# Patient Record
Sex: FEMALE | Race: WHITE | Employment: FULL TIME | URBAN - NONMETROPOLITAN AREA
[De-identification: names, ages, dates, MRNs, and addresses within clinical notes are randomized per-mention and may not be internally consistent; named-entity substitution may affect disease eponyms.]

---

## 2017-11-01 ENCOUNTER — OFFICE VISIT (OUTPATIENT)
Dept: FAMILY MEDICINE CLINIC | Age: 22
End: 2017-11-01

## 2017-11-01 VITALS
DIASTOLIC BLOOD PRESSURE: 80 MMHG | OXYGEN SATURATION: 98 % | HEART RATE: 127 BPM | RESPIRATION RATE: 17 BRPM | BODY MASS INDEX: 36.82 KG/M2 | HEIGHT: 65 IN | WEIGHT: 221 LBS | SYSTOLIC BLOOD PRESSURE: 134 MMHG

## 2017-11-01 DIAGNOSIS — J06.9 VIRAL URI: Primary | ICD-10-CM

## 2017-11-01 PROCEDURE — 99203 OFFICE O/P NEW LOW 30 MIN: CPT | Performed by: NURSE PRACTITIONER

## 2017-11-01 PROCEDURE — G8484 FLU IMMUNIZE NO ADMIN: HCPCS | Performed by: NURSE PRACTITIONER

## 2017-11-01 PROCEDURE — 4004F PT TOBACCO SCREEN RCVD TLK: CPT | Performed by: NURSE PRACTITIONER

## 2017-11-01 PROCEDURE — G8417 CALC BMI ABV UP PARAM F/U: HCPCS | Performed by: NURSE PRACTITIONER

## 2017-11-01 PROCEDURE — G8427 DOCREV CUR MEDS BY ELIG CLIN: HCPCS | Performed by: NURSE PRACTITIONER

## 2017-11-01 RX ORDER — FLUTICASONE PROPIONATE 50 MCG
2 SPRAY, SUSPENSION (ML) NASAL DAILY
Qty: 1 BOTTLE | Refills: 0 | Status: SHIPPED | OUTPATIENT
Start: 2017-11-01 | End: 2019-08-06 | Stop reason: SDUPTHER

## 2017-11-01 RX ORDER — BENZONATATE 100 MG/1
100 CAPSULE ORAL 3 TIMES DAILY PRN
Qty: 60 CAPSULE | Refills: 0 | Status: SHIPPED | OUTPATIENT
Start: 2017-11-01 | End: 2017-11-08

## 2017-11-01 ASSESSMENT — ENCOUNTER SYMPTOMS
COUGH: 1
SINUS PAIN: 1
SORE THROAT: 1
SWOLLEN GLANDS: 0
NAUSEA: 1
DIARRHEA: 0
RHINORRHEA: 1
VOMITING: 0

## 2017-11-01 ASSESSMENT — PATIENT HEALTH QUESTIONNAIRE - PHQ9
1. LITTLE INTEREST OR PLEASURE IN DOING THINGS: 0
SUM OF ALL RESPONSES TO PHQ QUESTIONS 1-9: 0
2. FEELING DOWN, DEPRESSED OR HOPELESS: 0
SUM OF ALL RESPONSES TO PHQ9 QUESTIONS 1 & 2: 0

## 2017-11-01 NOTE — LETTER
[facility]. [place] [address]  Phone: [phone]  Fax: [phone]    Natacha Nunez CNP        November 1, 2017     Patient: Andria Llamas   YOB: 1995   Date of Visit: 11/1/2017       To Whom It May Concern: It is my medical opinion that Demetrio Sep may return to full duty immediately with no restrictions. If you have any questions or concerns, please don't hesitate to call.     Sincerely,        Natacha Nunez CNP

## 2018-06-07 ENCOUNTER — TELEPHONE (OUTPATIENT)
Dept: FAMILY MEDICINE CLINIC | Age: 23
End: 2018-06-07

## 2019-04-20 ENCOUNTER — HOSPITAL ENCOUNTER (EMERGENCY)
Age: 24
Discharge: HOME | End: 2019-04-20
Attending: EMERGENCY MEDICINE

## 2019-04-20 VITALS
SYSTOLIC BLOOD PRESSURE: 181 MMHG | OXYGEN SATURATION: 98 % | TEMPERATURE: 101.8 F | DIASTOLIC BLOOD PRESSURE: 91 MMHG | HEART RATE: 112 BPM | RESPIRATION RATE: 16 BRPM | WEIGHT: 220 LBS | HEIGHT: 65 IN | BODY MASS INDEX: 36.65 KG/M2

## 2019-04-20 DIAGNOSIS — R50.9 FEBRILE ILLNESS, ACUTE: ICD-10-CM

## 2019-04-20 DIAGNOSIS — N61.0 MASTITIS: Primary | ICD-10-CM

## 2019-04-20 PROCEDURE — 6370000000 HC RX 637 (ALT 250 FOR IP): Performed by: EMERGENCY MEDICINE

## 2019-04-20 PROCEDURE — 99283 EMERGENCY DEPT VISIT LOW MDM: CPT

## 2019-04-20 RX ORDER — CEPHALEXIN 500 MG/1
500 CAPSULE ORAL 4 TIMES DAILY
Qty: 40 CAPSULE | Refills: 0 | Status: SHIPPED | OUTPATIENT
Start: 2019-04-20 | End: 2019-08-20 | Stop reason: ALTCHOICE

## 2019-04-20 RX ORDER — ACETAMINOPHEN 500 MG
1000 TABLET ORAL ONCE
Status: COMPLETED | OUTPATIENT
Start: 2019-04-20 | End: 2019-04-20

## 2019-04-20 RX ORDER — CEPHALEXIN 500 MG/1
500 CAPSULE ORAL ONCE
Status: COMPLETED | OUTPATIENT
Start: 2019-04-20 | End: 2019-04-20

## 2019-04-20 RX ADMIN — CEPHALEXIN 500 MG: 500 CAPSULE ORAL at 20:30

## 2019-04-20 RX ADMIN — ACETAMINOPHEN 1000 MG: 500 TABLET ORAL at 20:30

## 2019-04-20 ASSESSMENT — PAIN DESCRIPTION - LOCATION: LOCATION: BREAST;GENERALIZED

## 2019-04-20 ASSESSMENT — PAIN DESCRIPTION - ORIENTATION: ORIENTATION: LEFT;RIGHT

## 2019-04-20 ASSESSMENT — PAIN SCALES - GENERAL: PAINLEVEL_OUTOF10: 6

## 2019-04-20 ASSESSMENT — PAIN DESCRIPTION - DESCRIPTORS: DESCRIPTORS: ACHING

## 2019-04-21 NOTE — ED PROVIDER NOTES
Emergency Department Encounter  Location: [address]    Patient: Michael Wilkins  MRN: [id number]  : 1995  Date of evaluation: 2019  ED Provider: Marquita Lundborg, DO, FACEP    Chief Complaint:    Fever (since yesterday) and Nausea    [place]:  Michael Wilkins is a 21 y.o. female that presents to the emergency department with complaints of fever and nausea. The patient states that she is currently breast-feeding and she thinks she may have mastitis. She states her illness started a couple days ago with tenderness to her breasts and her breast feeling hot. Upon arrival to the ER the patient is a temp to 101.8. She states she's slightly nauseated but has not vomited. She denies any diarrhea. She's had no cough. She denies sore throat or upper respiratory congestion or upper respiratory symptoms. The patient has a 3month-old and she states she has been breast-feeding and even though her breasts feel engorged she does not feel like she is able to produce milk. She states her left breast is worse than her right. She denies any redness or streaking around the nipple. She states she's had no previous history of mastitis. She states her ObGyn doctor is Dr. John Hopkins from [place]. ROS:  At least 10 systems reviewed and otherwise acutely negative except as in the [address] History reviewed. No pertinent past medical history. Past Surgical History:   Procedure Laterality Date    CYST REMOVAL Left      History reviewed. No pertinent family history.   Social History     Socioeconomic History    Marital status:      Spouse name: Not on file    Number of children: Not on file    Years of education: Not on file    Highest education level: Not on file   Occupational History    Not on file   Social Needs    Financial resource strain: Not on file    Food insecurity:     Worry: Not on file     Inability: Not on file    Transportation needs:     Medical: Not on file Non-medical: Not on file   Tobacco Use    Smoking status: Current Some Day Smoker    Smokeless tobacco: Never Used   Substance and Sexual Activity    Alcohol use: Yes    Drug use: No    Sexual activity: Yes     Partners: Female   Lifestyle    Physical activity:     Days per week: Not on file     Minutes per session: Not on file    Stress: Not on file   Relationships    Social connections:     Talks on phone: Not on file     Gets together: Not on file     Attends Oriental orthodox service: Not on file     Active member of club or organization: Not on file     Attends meetings of clubs or organizations: Not on file     Relationship status: Not on file    Intimate partner violence:     Fear of current or ex partner: Not on file     Emotionally abused: Not on file     Physically abused: Not on file     Forced sexual activity: Not on file   Other Topics Concern    Not on file   Social History Narrative    Not on file     No current facility-administered medications for this encounter. Current Outpatient Medications   Medication Sig Dispense Refill    cephALEXin (KEFLEX) 500 MG capsule Take 1 capsule by mouth 4 times daily 40 capsule 0    fluticasone (FLONASE) 50 MCG/ACT nasal spray 2 sprays by Nasal route daily 1 Bottle 0     No Known Allergies    Nursing Notes Reviewed    Physical Exam:  ED Triage Vitals [04/20/19 1959]   Enc Vitals Group      BP (!) 181/91      Pulse 112      Resp 16      Temp 101.8 °F (38.8 °C)      Temp Source Oral      SpO2 98 %      Weight 220 lb (99.8 kg)      Height 5' 5\" (1.651 m)      Head Circumference       Peak Flow       Pain Score       Pain Loc       Pain Edu? Excl. in [address]? GENERAL APPEARANCE: Awake and alert. Cooperative. No acute distress. Nontoxic in appearance. She feels very hot  HEAD: Normocephalic. Atraumatic. EYES: EOM's grossly intact. Sclera anicteric. ENT: Tolerates saliva. No trismus. NECK: Supple. Trachea midline. CARDIO: RRR. Radial pulse 2+. Examination of her breasts reveals tenderness to palpation along the border of the left nipple. There is fullness to the breasts bilaterally. There is no redness or streaking that be seen but there is tenderness to palpation along the edge of the nipple especially in the left breast from the 12:00 to 3:00 position. LUNGS: Respirations unlabored. CTAB. ABDOMEN: Soft. Non-distended. Non-tender. EXTREMITIES: No acute deformities. SKIN: Warm and dry. NEUROLOGICAL: No gross facial drooping. Moves all 4 extremities spontaneously. PSYCHIATRIC: Normal mood. Labs:  No results found for this visit on 04/20/19. Radiographs (if obtained):  [] The following radiograph was interpreted by myself in the absence of a radiologist:  [x] Radiologist's Report reviewed at time of ED visit:  No orders to display       ED Course and MDM:  At this time the patient will be started on Keflex and is encouraged to use Tylenol and ibuprofen for fever. She is encouraged to follow-up with her ObGyn doctor for recheck early next week. She is encouraged to pump and dump for the next few days until her symptoms receded. She is instructed to return if her condition worsens. She is discharged in stable condition at this time with a presumptive diagnosis of mastitis. Final Impression:  1. Mastitis    2.  Febrile illness, acute      DISPOSITION Decision To Discharge    Patient referred to:  Gregory Lilly MD    In 2 days  For follow up    Discharge medications:  Discharge Medication List as of 4/20/2019  8:28 PM      START taking these medications    Details   cephALEXin (KEFLEX) 500 MG capsule Take 1 capsule by mouth 4 times daily, Disp-40 capsule, R-0Print           (Please note that portions of this note may have been completed with a voice recognition program. Efforts were made to edit the dictations but occasionally words are mis-transcribed.)    Citlaly Tirado DO, [facility]  Board certified in [address] [facility] [address],   04/20/19 2110

## 2019-08-06 DIAGNOSIS — J06.9 VIRAL URI: ICD-10-CM

## 2019-08-06 RX ORDER — FLUTICASONE PROPIONATE 50 MCG
2 SPRAY, SUSPENSION (ML) NASAL DAILY
Qty: 1 BOTTLE | Refills: 5 | Status: SHIPPED | OUTPATIENT
Start: 2019-08-06 | End: 2019-08-20 | Stop reason: ALTCHOICE

## 2019-08-20 ENCOUNTER — OFFICE VISIT (OUTPATIENT)
Dept: FAMILY MEDICINE CLINIC | Age: 24
End: 2019-08-20
Payer: COMMERCIAL

## 2019-08-20 VITALS
DIASTOLIC BLOOD PRESSURE: 80 MMHG | WEIGHT: 236.6 LBS | HEART RATE: 88 BPM | RESPIRATION RATE: 16 BRPM | BODY MASS INDEX: 39.37 KG/M2 | SYSTOLIC BLOOD PRESSURE: 124 MMHG

## 2019-08-20 DIAGNOSIS — R00.2 HEART PALPITATIONS: Primary | ICD-10-CM

## 2019-08-20 PROCEDURE — 99213 OFFICE O/P EST LOW 20 MIN: CPT | Performed by: NURSE PRACTITIONER

## 2019-08-20 ASSESSMENT — ENCOUNTER SYMPTOMS
CHEST TIGHTNESS: 0
ABDOMINAL PAIN: 0
SHORTNESS OF BREATH: 0
EYES NEGATIVE: 1
EYE PAIN: 0
NAUSEA: 0
WHEEZING: 0
COUGH: 0
RESPIRATORY NEGATIVE: 1
GASTROINTESTINAL NEGATIVE: 1

## 2019-08-20 ASSESSMENT — PATIENT HEALTH QUESTIONNAIRE - PHQ9
1. LITTLE INTEREST OR PLEASURE IN DOING THINGS: 0
2. FEELING DOWN, DEPRESSED OR HOPELESS: 0
SUM OF ALL RESPONSES TO PHQ9 QUESTIONS 1 & 2: 0
SUM OF ALL RESPONSES TO PHQ QUESTIONS 1-9: 0
SUM OF ALL RESPONSES TO PHQ QUESTIONS 1-9: 0

## 2021-09-02 ENCOUNTER — E-VISIT (OUTPATIENT)
Dept: OTHER | Facility: CLINIC | Age: 26
End: 2021-09-02

## 2021-09-02 DIAGNOSIS — M54.50 ACUTE MIDLINE LOW BACK PAIN, UNSPECIFIED WHETHER SCIATICA PRESENT: Primary | ICD-10-CM

## 2021-09-02 PROCEDURE — 99421 OL DIG E/M SVC 5-10 MIN: CPT | Performed by: NURSE PRACTITIONER

## 2021-09-06 RX ORDER — CYCLOBENZAPRINE HCL 5 MG
5 TABLET ORAL 3 TIMES DAILY PRN
Qty: 30 TABLET | Refills: 0 | Status: SHIPPED | OUTPATIENT
Start: 2021-09-06 | End: 2021-09-16

## 2021-09-06 RX ORDER — METHYLPREDNISOLONE 4 MG/1
TABLET ORAL
Qty: 1 KIT | Refills: 0 | Status: SHIPPED | OUTPATIENT
Start: 2021-09-06 | End: 2021-09-12

## 2021-09-06 NOTE — PROGRESS NOTES
Reviewed questionnaire    Reviewed meds/allergies    Dx Lower back pain    Plan Rx given for flexeril and medrol dose pack, follow up with PCP if no improvement    Time spent on visit 5 min

## 2021-09-14 ENCOUNTER — OFFICE VISIT (OUTPATIENT)
Dept: FAMILY MEDICINE CLINIC | Age: 26
End: 2021-09-14
Payer: COMMERCIAL

## 2021-09-14 VITALS
RESPIRATION RATE: 16 BRPM | WEIGHT: 238.2 LBS | DIASTOLIC BLOOD PRESSURE: 60 MMHG | SYSTOLIC BLOOD PRESSURE: 110 MMHG | TEMPERATURE: 98.1 F | BODY MASS INDEX: 39.64 KG/M2 | OXYGEN SATURATION: 99 % | HEART RATE: 97 BPM

## 2021-09-14 DIAGNOSIS — J30.2 SEASONAL ALLERGIES: ICD-10-CM

## 2021-09-14 DIAGNOSIS — E66.9 OBESITY, UNSPECIFIED CLASSIFICATION, UNSPECIFIED OBESITY TYPE, UNSPECIFIED WHETHER SERIOUS COMORBIDITY PRESENT: ICD-10-CM

## 2021-09-14 DIAGNOSIS — R42 DIZZY SPELLS: ICD-10-CM

## 2021-09-14 DIAGNOSIS — G47.9 SLEEP DISTURBANCE: Primary | ICD-10-CM

## 2021-09-14 DIAGNOSIS — Z13.1 DIABETES MELLITUS SCREENING: ICD-10-CM

## 2021-09-14 DIAGNOSIS — Z82.0 FAMILY HISTORY OF SLEEP APNEA: ICD-10-CM

## 2021-09-14 DIAGNOSIS — R06.02 SHORTNESS OF BREATH: ICD-10-CM

## 2021-09-14 DIAGNOSIS — F32.A DEPRESSION, UNSPECIFIED DEPRESSION TYPE: ICD-10-CM

## 2021-09-14 DIAGNOSIS — G44.209 TENSION HEADACHE: ICD-10-CM

## 2021-09-14 DIAGNOSIS — Z01.30 BLOOD PRESSURE CHECK: ICD-10-CM

## 2021-09-14 PROCEDURE — 99214 OFFICE O/P EST MOD 30 MIN: CPT | Performed by: PHYSICIAN ASSISTANT

## 2021-09-14 RX ORDER — FLUTICASONE PROPIONATE 50 MCG
1 SPRAY, SUSPENSION (ML) NASAL DAILY
Qty: 16 G | Refills: 0 | Status: SHIPPED | OUTPATIENT
Start: 2021-09-14

## 2021-09-14 RX ORDER — CETIRIZINE HYDROCHLORIDE 10 MG/1
10 TABLET ORAL DAILY
Qty: 90 TABLET | Refills: 1 | Status: SHIPPED | OUTPATIENT
Start: 2021-09-14

## 2021-09-14 ASSESSMENT — PATIENT HEALTH QUESTIONNAIRE - PHQ9
4. FEELING TIRED OR HAVING LITTLE ENERGY: 2
SUM OF ALL RESPONSES TO PHQ QUESTIONS 1-9: 6
8. MOVING OR SPEAKING SO SLOWLY THAT OTHER PEOPLE COULD HAVE NOTICED. OR THE OPPOSITE, BEING SO FIGETY OR RESTLESS THAT YOU HAVE BEEN MOVING AROUND A LOT MORE THAN USUAL: 0
9. THOUGHTS THAT YOU WOULD BE BETTER OFF DEAD, OR OF HURTING YOURSELF: 0
3. TROUBLE FALLING OR STAYING ASLEEP: 0
5. POOR APPETITE OR OVEREATING: 1
SUM OF ALL RESPONSES TO PHQ QUESTIONS 1-9: 6
SUM OF ALL RESPONSES TO PHQ QUESTIONS 1-9: 6
7. TROUBLE CONCENTRATING ON THINGS, SUCH AS READING THE NEWSPAPER OR WATCHING TELEVISION: 0
SUM OF ALL RESPONSES TO PHQ9 QUESTIONS 1 & 2: 3
2. FEELING DOWN, DEPRESSED OR HOPELESS: 1
6. FEELING BAD ABOUT YOURSELF - OR THAT YOU ARE A FAILURE OR HAVE LET YOURSELF OR YOUR FAMILY DOWN: 0
1. LITTLE INTEREST OR PLEASURE IN DOING THINGS: 2
10. IF YOU CHECKED OFF ANY PROBLEMS, HOW DIFFICULT HAVE THESE PROBLEMS MADE IT FOR YOU TO DO YOUR WORK, TAKE CARE OF THINGS AT HOME, OR GET ALONG WITH OTHER PEOPLE: 1

## 2021-09-14 NOTE — PROGRESS NOTES
Jovanni Daigle (:  1995) is a 32 y.o. female,Established patient, here for evaluation of the following chief complaint(s): Other (patient is having issues with low blood pressure ), Dizziness (has dizziness off and on last episode was last wednesday ), Headache (having headaches often ), and Diabetes (has concerns about diabetes has a family history )    This is my first patient encounter with Jovanni Daigle; chart review was completed. SUBJECTIVE/OBJECTIVE:  LILIANA Daigle is a pleasant 32 y.o. female presenting to clinic today to establish care with new provider/multiple medical complaints. Patient expresses concern for low blood pressure; patient reports she has checked her blood pressures occasionally over the last several weeks and reports readings of 109/74, 119/80; today in clinic blood pressure is 110/60. Patient denies excess fatigue or ongoing lightheadedness. Dizziness -patient reports a few episodes of episodic dizziness with sensation of room spinning which occurs with sudden movements of the head, bending over and standing up quickly; patient reports she has never experienced these sensations before; patient reports episodes to be last 15 to 20 seconds and resolve on their own. Patient denies other neurologic complaints, denies tinnitus or hearing changes. Headaches -patient describes an achy nondebilitating but noticeable headache in her forehead which she experiences approximately once every 2 days or so; patient denies known trigger however has had change in sleep schedule as she now is waking up very early for new job; patient reports that she takes Tylenol for his headaches which does provide benefit.      Sleep disturbance -patient reports that she typically wakes up feeling short of breath; patient states that she experiences labored breathing for a few minutes upon awakening; patient reports this has been ongoing now for the past couple months; patient does report mild tiredness throughout the day, did recently start a new job and is waking up at 3 AM daily. Patient does report that mother has obstructive sleep apnea. Patient reports that she does snore. Diabetes screening -patient would like to be screened for diabetes; reports significant family history of both type I and type 2 diabetes patient states that she checked her blood sugar recently and it was 132 while fasting. Patient reports mild polyuria and polydipsia however denies other symptoms. History of palpitations - patient does have history of sensation of palpitations, previous EKG has been normal, Holter monitor ordered by previous PCP came back normal.  Patient denies any recent issues or symptoms. Low back pain flareup-patient seen via E-Visit 9/6/2021; given prescription for Flexeril and Medrol Dosepak. Reports prior similar episodes in the past.      Current Outpatient Medications   Medication Sig Dispense Refill    sertraline (ZOLOFT) 50 MG tablet Take 1 tablet by mouth daily 90 tablet 1    cetirizine (ZYRTEC) 10 MG tablet Take 1 tablet by mouth daily 90 tablet 1    fluticasone (FLONASE) 50 MCG/ACT nasal spray 1 spray by Each Nostril route daily 16 g 0    cyclobenzaprine (FLEXERIL) 5 MG tablet Take 1 tablet by mouth 3 times daily as needed for Muscle spasms (Patient taking differently: Take 5 mg by mouth 3 times daily as needed for Muscle spasms prn) 30 tablet 0     No current facility-administered medications for this visit. Review of Systems   Constitutional: Positive for fatigue. Negative for appetite change, chills and fever. HENT: Negative for congestion, ear pain, hearing loss, rhinorrhea and sore throat. Eyes: Negative for photophobia, pain, discharge and redness. Respiratory: Negative for cough, chest tightness, shortness of breath and wheezing. Cardiovascular: Negative for chest pain, palpitations and leg swelling.    Gastrointestinal: Negative for abdominal pain, blood in stool, constipation, diarrhea, nausea and vomiting. Endocrine: Positive for polydipsia and polyuria. Genitourinary: Negative for difficulty urinating, dysuria, flank pain, frequency, hematuria and urgency. Musculoskeletal: Negative for arthralgias, back pain, gait problem and joint swelling. Skin: Negative for color change and rash. Neurological: Positive for dizziness ( spells) and headaches. Negative for syncope, weakness and light-headedness. Hematological: Negative for adenopathy. Psychiatric/Behavioral: Positive for sleep disturbance. Negative for agitation, behavioral problems and suicidal ideas. The patient is not nervous/anxious. Physical Exam  Vitals and nursing note reviewed. Constitutional:       General: She is not in acute distress. Appearance: She is obese. She is not ill-appearing. HENT:      Head: Normocephalic and atraumatic. Right Ear: Tympanic membrane and external ear normal.      Left Ear: Tympanic membrane and external ear normal.      Nose: No congestion or rhinorrhea. Mouth/Throat:      Mouth: Mucous membranes are moist.      Pharynx: No oropharyngeal exudate or posterior oropharyngeal erythema. Neck:      Vascular: No carotid bruit. Cardiovascular:      Rate and Rhythm: Normal rate. Pulses: Normal pulses. Pulmonary:      Effort: Pulmonary effort is normal.   Abdominal:      Palpations: Abdomen is soft. Musculoskeletal:         General: Normal range of motion. Cervical back: Normal range of motion. No rigidity. Skin:     General: Skin is warm and dry. Capillary Refill: Capillary refill takes less than 2 seconds. Neurological:      Mental Status: She is alert and oriented to person, place, and time. Mental status is at baseline. Psychiatric:         Mood and Affect: Mood normal.         ASSESSMENT/PLAN:  1.  Sleep disturbance   -Patient would like to have sleep study done, possible underlying obstructive sleep apnea. Recommend weight loss. -     Prabha Jackson CNP, Pulmonology, [place]  2. Family history of sleep apnea  -     Kristina Hollis, BETTY, Pulmonology, [place]  3. Diabetes mellitus screening   -Patient on patient's symptoms and family history and body habitus, will check A1c. Recommend low carbohydrate diet. Increase physical activity. -     Comprehensive Metabolic Panel; Future  -     HEMOGLOBIN A1C; Future  4. Obesity, unspecified classification, unspecified obesity type, unspecified whether serious comorbidity present   -The patient is asked to make an attempt to improve diet and exercise patterns to aid in medical management of this problem. -     CBC Auto Differential; Future  -     Comprehensive Metabolic Panel; Future  -     Lipid Panel; Future  -     HEMOGLOBIN A1C; Future  5. Depression, unspecified depression type   -Continue with Zoloft as previously prescribed; patient reports that she self discontinued this medication and noticed a difference in mood and depression; has recently restarted and is requesting refill. Patient denies side effects or issues; patient denies SI.  -     sertraline (ZOLOFT) 50 MG tablet; Take 1 tablet by mouth daily, Disp-90 tablet, R-1Normal  6. Seasonal allergies   -Patient reports that allergy symptoms are somewhat contributory to headaches, reports occasional sinus congestion; recommend initiation of antihistamine and Flonase daily during allergy season etc.  Discussed proper use, side effects etc.  -     cetirizine (ZYRTEC) 10 MG tablet; Take 1 tablet by mouth daily, Disp-90 tablet, R-1Normal  -     fluticasone (FLONASE) 50 MCG/ACT nasal spray; 1 spray by Each Nostril route daily, Disp-16 g, R-0Normal  7.  Dizzy spells   -Likely BPPV; stressed adequate fluid intake and control of allergy symptoms; patient reports that episodes only last for 15 to 20 seconds; recommend avoidance of maneuvers likely to trigger dizzy spells; can consider referral to ENT if persistent or worsening. 8. Tension headache   -Recommend lifestyle modifications, adequate hydration, proper nutrition/adequate protein intake, will obtain sleep study as sleep apnea may be contributory; can continue with Tylenol or ibuprofen as needed for headaches. Patient denies any other neurologic symptoms associated with headaches. 9. Shortness of breath   -Upon awakening; referral placed for pulmonology to obtain sleep study. -     Darlin Santos, CNP, Pulmonology, [place]  10. Blood pressure check   -Patient's reported blood pressures at home and blood pressure today in clinic is not overly concerning at this time; advised patient to intermittently check blood pressures at home and if numbers are consistently less than 100 or less than 60 and patient is symptomatic, can consider referral to cardiology. Recommend adequate hydration etc.    Return in about 6 months (around 3/14/2022), or if symptoms worsen or fail to improve, for Follow Up. An electronic signature was used to authenticate this note.     --MIKEY Herrera

## 2021-09-15 ASSESSMENT — ENCOUNTER SYMPTOMS
EYE PAIN: 0
CONSTIPATION: 0
COUGH: 0
VOMITING: 0
PHOTOPHOBIA: 0
RHINORRHEA: 0
BACK PAIN: 0
BLOOD IN STOOL: 0
CHEST TIGHTNESS: 0
COLOR CHANGE: 0
DIARRHEA: 0
NAUSEA: 0
SHORTNESS OF BREATH: 0
EYE REDNESS: 0
EYE DISCHARGE: 0
SORE THROAT: 0
ABDOMINAL PAIN: 0
WHEEZING: 0

## 2021-09-16 ENCOUNTER — E-VISIT (OUTPATIENT)
Dept: PRIMARY CARE CLINIC | Age: 26
End: 2021-09-16
Payer: COMMERCIAL

## 2021-09-16 DIAGNOSIS — J30.9 ALLERGIC RHINITIS, UNSPECIFIED SEASONALITY, UNSPECIFIED TRIGGER: Primary | ICD-10-CM

## 2021-09-16 DIAGNOSIS — R09.89 CHEST CONGESTION: ICD-10-CM

## 2021-09-16 PROCEDURE — 99422 OL DIG E/M SVC 11-20 MIN: CPT | Performed by: INTERNAL MEDICINE

## 2021-09-16 RX ORDER — GUAIFENESIN 600 MG/1
1200 TABLET, EXTENDED RELEASE ORAL 2 TIMES DAILY PRN
COMMUNITY
Start: 2021-09-16

## 2021-09-16 RX ORDER — FLUTICASONE PROPIONATE 50 MCG
1 SPRAY, SUSPENSION (ML) NASAL DAILY
Qty: 16 G | Refills: 10 | Status: SHIPPED | OUTPATIENT
Start: 2021-09-16 | End: 2022-09-11

## 2021-09-16 ASSESSMENT — LIFESTYLE VARIABLES
SMOKING_YEARS: 1
SMOKING_STATUS: YES

## 2021-09-16 NOTE — PROGRESS NOTES
Your symptoms are not consistent with a sinus infection. You probably have a viral infection with increased mucus production and your coughing to clear your secretions. Mucinex may help as well as Flonase to reduce the congestion in your sinuses. Both medications will be sent to the pharmacy. Continue to use Tylenol for muscle, joint aches and for fever reduction. 11 to 20 minutes were spent completing this ED visit.

## 2023-01-19 ENCOUNTER — APPOINTMENT (OUTPATIENT)
Dept: ULTRASOUND IMAGING | Age: 28
End: 2023-01-19
Payer: COMMERCIAL

## 2023-01-19 ENCOUNTER — HOSPITAL ENCOUNTER (EMERGENCY)
Age: 28
Discharge: HOME | End: 2023-01-19
Attending: EMERGENCY MEDICINE
Payer: COMMERCIAL

## 2023-01-19 VITALS
SYSTOLIC BLOOD PRESSURE: 120 MMHG | BODY MASS INDEX: 41.65 KG/M2 | WEIGHT: 250 LBS | HEIGHT: 65 IN | RESPIRATION RATE: 17 BRPM | HEART RATE: 88 BPM | TEMPERATURE: 98.6 F | OXYGEN SATURATION: 99 % | DIASTOLIC BLOOD PRESSURE: 53 MMHG

## 2023-01-19 DIAGNOSIS — O20.0 THREATENED MISCARRIAGE: ICD-10-CM

## 2023-01-19 DIAGNOSIS — O46.90 VAGINAL BLEEDING IN PREGNANCY: Primary | ICD-10-CM

## 2023-01-19 LAB
ALBUMIN SERPL-MCNC: 4.4 GM/DL (ref 3.4–5)
ALP BLD-CCNC: 71 IU/L (ref 40–129)
ALT SERPL-CCNC: 14 U/L (ref 10–40)
ANION GAP SERPL CALCULATED.3IONS-SCNC: 13 MMOL/L (ref 4–16)
AST SERPL-CCNC: 11 IU/L (ref 15–37)
BACTERIA: ABNORMAL /HPF
BASOPHILS ABSOLUTE: 0.1 K/CU MM
BASOPHILS RELATIVE PERCENT: 0.5 % (ref 0–1)
BILIRUB SERPL-MCNC: 0.2 MG/DL (ref 0–1)
BILIRUBIN URINE: NEGATIVE MG/DL
BLOOD, URINE: ABNORMAL
BUN BLDV-MCNC: 8 MG/DL (ref 6–23)
CALCIUM SERPL-MCNC: 9.2 MG/DL (ref 8.3–10.6)
CAST TYPE: ABNORMAL /HPF
CHLORIDE BLD-SCNC: 103 MMOL/L (ref 99–110)
CLARITY: CLEAR
CO2: 21 MMOL/L (ref 21–32)
COLOR: YELLOW
CREAT SERPL-MCNC: 0.4 MG/DL (ref 0.6–1.1)
CRYSTAL TYPE: NEGATIVE /HPF
DIFFERENTIAL TYPE: ABNORMAL
EOSINOPHILS ABSOLUTE: 0.1 K/CU MM
EOSINOPHILS RELATIVE PERCENT: 0.7 % (ref 0–3)
EPITHELIAL CELLS, UA: 1 /HPF
GFR SERPL CREATININE-BSD FRML MDRD: >60 ML/MIN/1.73M2
GLUCOSE BLD-MCNC: 106 MG/DL (ref 70–99)
GLUCOSE, URINE: NEGATIVE MG/DL
GONADOTROPIN, CHORIONIC (HCG) QUANT: 5762 UIU/ML
HCT VFR BLD CALC: 36.8 % (ref 37–47)
HEMOGLOBIN: 11.8 GM/DL (ref 12.5–16)
IMMATURE NEUTROPHIL %: 0.3 % (ref 0–0.43)
KETONES, URINE: NEGATIVE MG/DL
LEUKOCYTE ESTERASE, URINE: ABNORMAL
LYMPHOCYTES ABSOLUTE: 3.3 K/CU MM
LYMPHOCYTES RELATIVE PERCENT: 28.7 % (ref 24–44)
MCH RBC QN AUTO: 25.8 PG (ref 27–31)
MCHC RBC AUTO-ENTMCNC: 32.1 % (ref 32–36)
MCV RBC AUTO: 80.5 FL (ref 78–100)
MONOCYTES ABSOLUTE: 0.8 K/CU MM
MONOCYTES RELATIVE PERCENT: 7.3 % (ref 0–4)
NITRITE URINE, QUANTITATIVE: NEGATIVE
PDW BLD-RTO: 13.7 % (ref 11.7–14.9)
PH, URINE: 6.5 (ref 5–8)
PLATELET # BLD: 299 K/CU MM (ref 140–440)
PMV BLD AUTO: 9.5 FL (ref 7.5–11.1)
POTASSIUM SERPL-SCNC: 3.6 MMOL/L (ref 3.5–5.1)
PROTEIN UA: NEGATIVE MG/DL
RBC # BLD: 4.57 M/CU MM (ref 4.2–5.4)
RBC URINE: 3 /HPF (ref 0–6)
SEGMENTED NEUTROPHILS ABSOLUTE COUNT: 7.2 K/CU MM
SEGMENTED NEUTROPHILS RELATIVE PERCENT: 62.5 % (ref 36–66)
SODIUM BLD-SCNC: 137 MMOL/L (ref 135–145)
SPECIFIC GRAVITY UA: <1.005 (ref 1–1.03)
TOTAL IMMATURE NEUTOROPHIL: 0.04 K/CU MM
TOTAL PROTEIN: 7.3 GM/DL (ref 6.4–8.2)
UROBILINOGEN, URINE: 0.2 MG/DL (ref 0.2–1)
WBC # BLD: 11.5 K/CU MM (ref 4–10.5)
WBC UA: 1 /HPF (ref 0–5)

## 2023-01-19 PROCEDURE — 80053 COMPREHEN METABOLIC PANEL: CPT

## 2023-01-19 PROCEDURE — 93975 VASCULAR STUDY: CPT

## 2023-01-19 PROCEDURE — 84702 CHORIONIC GONADOTROPIN TEST: CPT

## 2023-01-19 PROCEDURE — 76817 TRANSVAGINAL US OBSTETRIC: CPT

## 2023-01-19 PROCEDURE — 85025 COMPLETE CBC W/AUTO DIFF WBC: CPT

## 2023-01-19 PROCEDURE — 81001 URINALYSIS AUTO W/SCOPE: CPT

## 2023-01-19 PROCEDURE — 99284 EMERGENCY DEPT VISIT MOD MDM: CPT

## 2023-01-19 ASSESSMENT — PAIN DESCRIPTION - DESCRIPTORS: DESCRIPTORS: ACHING;CRAMPING

## 2023-01-19 ASSESSMENT — PAIN DESCRIPTION - PAIN TYPE: TYPE: ACUTE PAIN

## 2023-01-19 ASSESSMENT — PAIN DESCRIPTION - LOCATION: LOCATION: ABDOMEN

## 2023-01-19 ASSESSMENT — PAIN SCALES - GENERAL: PAINLEVEL_OUTOF10: 1

## 2023-01-19 ASSESSMENT — PAIN - FUNCTIONAL ASSESSMENT: PAIN_FUNCTIONAL_ASSESSMENT: 0-10

## 2023-01-19 NOTE — ED TRIAGE NOTES
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

## 2023-01-20 NOTE — DISCHARGE INSTRUCTIONS
Your quantitative hCG which is your blood pregnancy test today came back at around 5700. Your ultrasound does show a gestational sac and a yolk sac but no fetal pole. Please follow-up in 2 days for repeat quantitative hCG testing as well as repeat imaging. He may go to your obstetrician or come back to the emergency department for further testing. If you develop any worsening concerning symptoms, please seek immediate medical evaluation.

## 2023-01-20 NOTE — ED PROVIDER NOTES
[facility] [id number]      Pt Name: Arturo Cowan  MRN: [id number]  [place] 1995  Date of evaluation: 1/19/2023  Provider: Brianna Payne MD    CHIEF COMPLAINT       Chief Complaint   Patient presents with    Vaginal Bleeding     While 8 weeks pregnant - hx of multiple miscarriages         HISTORY OF PRESENT ILLNESS      Arturo Cowan is a 32 y.o. female who presents to the emergency department  for   Chief Complaint   Patient presents with    Vaginal Bleeding     While 8 weeks pregnant - hx of multiple miscarriages       51-year-old female presents with vaginal bleeding. She states that the bleeding started postcoitally. She is a G6, P2 who reports that she is about 8 weeks pregnant based on dates. She does not yet have a confirmed intrauterine pregnancy. She has been seen by an obstetrician in [facility] and underwent a blood pregnancy test.  She denies any abdominal trauma or injury. She is having some mild lower abdominal pain. Denies any dysuria. No fever, chills or constitutional infectious symptoms. No history of bleeding difficulties. She is not anticoagulated. GCS of 15. She reports that her bleeding is about as heavy as a menstrual cycle. She reports that her blood type is A+. Nursing Notes, Triage Notes & Vital Signs were reviewed. REVIEW OF SYSTEMS    (2-9 systems for level 4, 10 or more for level 5)     Review of Systems   Genitourinary:  Positive for vaginal bleeding. Except as noted above the remainder of the review of systems was reviewed and negative. PAST MEDICAL HISTORY   History reviewed. No pertinent past medical history. Prior to Admission medications    Medication Sig Start Date End Date Taking?  Authorizing Provider   guaiFENesin (MUCINEX) 600 MG extended release tablet Take 2 tablets by mouth 2 times daily as needed for Congestion 9/16/21   Sonu Serra MD   fluticasone [facility]) 50 MCG/ACT nasal spray 1 spray by Nasal route daily 9/16/21 9/11/22  John Payne MD   sertraline (ZOLOFT) 50 MG tablet Take 1 tablet by mouth daily 9/14/21   MIKEY Kim   cetirizine (ZYRTEC) 10 MG tablet Take 1 tablet by mouth daily 9/14/21   MIKEY Kim   fluticasone [facility]) 50 MCG/ACT nasal spray 1 spray by Each Nostril route daily 9/14/21   MIKEY Kim        Patient Active Problem List   Diagnosis    Heart palpitations         SURGICAL HISTORY       Past Surgical History:   Procedure Laterality Date    CYST REMOVAL Left     LYMPH NODE DISSECTION           CURRENT MEDICATIONS       Discharge Medication List as of 1/19/2023 11:55 PM        CONTINUE these medications which have NOT CHANGED    Details   guaiFENesin (MUCINEX) 600 MG extended release tablet Take 2 tablets by mouth 2 times daily as needed for CongestionOTC      sertraline (ZOLOFT) 50 MG tablet Take 1 tablet by mouth daily, Disp-90 tablet, R-1Normal      cetirizine (ZYRTEC) 10 MG tablet Take 1 tablet by mouth daily, Disp-90 tablet, R-1Normal      fluticasone (FLONASE) 50 MCG/ACT nasal spray 1 spray by Each Nostril route daily, Disp-16 g, R-0Normal             ALLERGIES     Patient has no known allergies. FAMILY HISTORY     History reviewed. No pertinent family history.        SOCIAL HISTORY       Social History     Socioeconomic History    Marital status:      Spouse name: None    Number of children: None    Years of education: None    Highest education level: None   Tobacco Use    Smoking status: Some Days    Smokeless tobacco: Never   Substance and Sexual Activity    Alcohol use: Yes    Drug use: No    Sexual activity: Yes     Partners: Female   Social History Narrative    ** Merged History Encounter **            SCREENINGS    Gab Coma Scale  Eye Opening: Spontaneous  Best Verbal Response: Oriented  Best Motor Response: Obeys commands  Ramsey Coma Scale Score: 15 PHYSICAL EXAM    (up to 7 for level 4, 8 or more for level 5)     ED Triage Vitals   BP Temp Temp Source Heart Rate Resp SpO2 Height Weight   01/19/23 1846 01/19/23 1841 01/19/23 1841 01/19/23 1846 01/19/23 1846 01/19/23 1846 01/19/23 1846 01/19/23 1846   (!) 120/53 98.6 °F (37 °C) Oral 88 17 99 % 5' 5\" (1.651 m) 250 lb (113.4 kg)       Physical Exam  Vitals reviewed. Constitutional:       Appearance: She is not ill-appearing or toxic-appearing. HENT:      Head: Normocephalic and atraumatic. Nose: No congestion or rhinorrhea. Mouth/Throat:      Mouth: Mucous membranes are moist.   Eyes:      General:         Right eye: No discharge. Left eye: No discharge. Extraocular Movements: Extraocular movements intact. Pupils: Pupils are equal, round, and reactive to light. Cardiovascular:      Rate and Rhythm: Normal rate. Heart sounds: No friction rub. No gallop. Pulmonary:      Effort: Pulmonary effort is normal.      Breath sounds: No rhonchi or rales. Abdominal:      Palpations: Abdomen is soft. Tenderness: There is no guarding. Musculoskeletal:         General: No swelling or tenderness. Normal range of motion. Cervical back: Normal range of motion and neck supple. Skin:     General: Skin is warm. Capillary Refill: Capillary refill takes less than 2 seconds. Neurological:      General: No focal deficit present. Mental Status: She is alert.        DIAGNOSTIC RESULTS     Labs Reviewed   COMPREHENSIVE METABOLIC PANEL - Abnormal; Notable for the following components:       Result Value    Creatinine 0.4 (*)     Glucose 106 (*)     AST 11 (*)     All other components within normal limits   CBC WITH AUTO DIFFERENTIAL - Abnormal; Notable for the following components:    WBC 11.5 (*)     Hemoglobin 11.8 (*)     Hematocrit 36.8 (*)     MCH 25.8 (*)     Monocytes % 7.3 (*)     All other components within normal limits   URINALYSIS - Abnormal; Notable for the following components:    Blood, Urine LARGE NUMBER OR AMOUNT OBSERVED (*)     Leukocyte Esterase, Urine TRACE (*)     All other components within normal limits   MICROSCOPIC URINALYSIS - Abnormal; Notable for the following components:    Bacteria, UA RARE (*)     All other components within normal limits   HCG, QUANTITATIVE, PREGNANCY          RADIOLOGY:     Non-plain film images such as CT, Ultrasound and MRI are read by the radiologist. Plain radiographic images are visualized and preliminarily interpreted by the emergency physician. Interpretation per the Radiologist below, if available at the time of this note:    US OB TRANSVAGINAL   Final Result   1. There is a single intrauterine gestation measuring 9.5 mm in size, which   is out of range for dating. Of note, there is a yolk sac that appears large   relative the gestational sac size, measuring 6.3 mm. No fetal pole is seen. Recommend close sonographic follow-up. 2. Unremarkable ovaries with appropriate arterial and venous flow. US DUP ABD PEL RETRO SCROT COMPLETE   Final Result   1. There is a single intrauterine gestation measuring 9.5 mm in size, which   is out of range for dating. Of note, there is a yolk sac that appears large   relative the gestational sac size, measuring 6.3 mm. No fetal pole is seen. Recommend close sonographic follow-up. 2. Unremarkable ovaries with appropriate arterial and venous flow.                ED BEDSIDE ULTRASOUND:   Performed by ED Physician Alberto Bates MD       LABS:  Labs Reviewed   COMPREHENSIVE METABOLIC PANEL - Abnormal; Notable for the following components:       Result Value    Creatinine 0.4 (*)     Glucose 106 (*)     AST 11 (*)     All other components within normal limits   CBC WITH AUTO DIFFERENTIAL - Abnormal; Notable for the following components:    WBC 11.5 (*)     Hemoglobin 11.8 (*)     Hematocrit 36.8 (*)     MCH 25.8 (*)     Monocytes % 7.3 (*)     All other components within normal limits   URINALYSIS - Abnormal; Notable for the following components:    Blood, Urine LARGE NUMBER OR AMOUNT OBSERVED (*)     Leukocyte Esterase, Urine TRACE (*)     All other components within normal limits   MICROSCOPIC URINALYSIS - Abnormal; Notable for the following components:    Bacteria, UA RARE (*)     All other components within normal limits   HCG, QUANTITATIVE, PREGNANCY       All other labs were within normal range or not returned as of this dictation.    EMERGENCY DEPARTMENT COURSE and DIFFERENTIAL DIAGNOSIS/MDM:   Vitals:    Vitals:    01/19/23 1841 01/19/23 1846   BP:  (!) 120/53   Pulse:  88   Resp:  17   Temp: 98.6 °F (37 °C)    TempSrc: Oral    SpO2:  99%   Weight:  250 lb (113.4 kg)   Height:  5' 5\" (1.651 m)           MDM  Number of Diagnoses or Management Options  Threatened miscarriage  Vaginal bleeding in pregnancy  Diagnosis management comments: 27-year-old female presents with vaginal bleeding.  She reports she is 8 weeks pregnant by dates.  She does not have a confirmed intrauterine pregnancy.  She is a G6, P2.  She has been seen by an obstetrician in [facility] and underwent blood testing to confirm pregnancy.  She endorses that she developed vaginal bleeding this evening after sex.  She does endorse having some mild lower abdominal pain.  She is not anticoagulated.  She is on prenatal vitamins but no other routine medications.  No fevers, chills or constitutional infectious symptoms    She presents with normal vital signs.  Blood pressure is within normal limits.  She is afebrile.  No tachycardia.  Respirations are within normal limits.  Her oxygen saturations are in the high 90s on room air.    Her abdomen is soft and nonperitoneal.    Labs including CMP, CBC as well as quantitative hCG and urinalysis are obtained.    I did review care everywhere records.  I do see documentation of her blood type being a positive which she also reportedly positive.    Labs show that  her CMP and CBC overall unremarkable. Urinalysis does show some blood but no other significant signs of infection. Her quantitative hCG is around 5700. This does appear to be low based on her report of being 8 weeks pregnant. Ultrasound is ordered. 9:06 PM  We are waiting ultrasound. 12:13 AM  Ultrasound was obtained and has been reviewed. Ultrasound does show signs of a gestational sac and yolk sac but does not show fetal pole. Results are discussed with patient. Did discuss that the findings at this point could be consistent with threatened miscarriage. She does have a documented blood type of A+ so does not require RhoGAM.  She is instructed to follow-up in 2 days for repeat quantitative hCG testing as well as for possible repeat imaging. She will follow-up either with her obstetrician or at the emergency department. Agreeable plan of care. She is discharged ambulatory in stable condition with return precautions. -  Patient seen and evaluated in the emergency department. -  Triage and nursing notes reviewed and incorporated. -  Old chart records reviewed and incorporated. -  Work-up included:  See above  -  Results discussed with patient. CONSULTS:  None    PROCEDURES:  None performed unless otherwise noted below     Procedures        FINAL IMPRESSION      1. Vaginal bleeding in pregnancy    2.  Threatened miscarriage          DISPOSITION/PLAN   DISPOSITION Decision To Discharge 01/19/2023 11:50:48 PM      PATIENT REFERRED TO:  Your Obstetrician  Please follow-up in 2 days with your obstetrician  Schedule an appointment as soon as possible for a visit in 2 days      [facility] Emergency Department  [address] [place] [id number] [phone]  Go in 2 days      DISCHARGE MEDICATIONS:  Discharge Medication List as of 1/19/2023 11:55 PM          ED Provider Disposition Time  DISPOSITION Decision To Discharge 01/19/2023 11:50:48 PM      Appropriate personal protective equipment was worn during the patient's evaluation. These included surgical, eye protection, surgical mask or in 95 respirator and gloves. The patient was also placed in a surgical mask for the prevention of possible spread of respiratory viral illnesses. The Patient was instructed to read the package inserts with any medication that was prescribed. Major potential reactions and medication interactions were discussed. The Patient understands that there are numerous possible adverse reactions not covered. The patient was also instructed to arrange follow-up with his or her primary care provider for review of any pending labwork or incidental findings on any radiology results that were obtained. All efforts were made to discuss any incidental findings that require further monitoring. Controlled Substances Monitoring:     No flowsheet data found.     (Please note that portions of this note were completed with a voice recognition program.  Efforts were made to edit the dictations but occasionally words are mis-transcribed.)    Samantha Lawrence MD (electronically signed)  Attending Emergency Physician            Samantha Lawrence MD  01/20/23 8701

## 2023-06-05 ENCOUNTER — E-VISIT (OUTPATIENT)
Dept: PRIMARY CARE CLINIC | Age: 28
End: 2023-06-05
Payer: COMMERCIAL

## 2023-06-05 DIAGNOSIS — M54.40 ACUTE MIDLINE LOW BACK PAIN WITH SCIATICA, SCIATICA LATERALITY UNSPECIFIED: Primary | ICD-10-CM

## 2023-06-05 PROCEDURE — 99422 OL DIG E/M SVC 11-20 MIN: CPT | Performed by: NURSE PRACTITIONER

## 2023-06-05 RX ORDER — METHYLPREDNISOLONE 4 MG/1
TABLET ORAL
Qty: 1 KIT | Refills: 0 | Status: SHIPPED | OUTPATIENT
Start: 2023-06-05 | End: 2023-06-11

## 2023-06-05 RX ORDER — CYCLOBENZAPRINE HCL 5 MG
5 TABLET ORAL 3 TIMES DAILY PRN
Qty: 30 TABLET | Refills: 0 | Status: SHIPPED | OUTPATIENT
Start: 2023-06-05 | End: 2023-06-15

## 2023-06-05 NOTE — PROGRESS NOTES
Reviewed questionnaire     Reviewed meds/allergies    Dx Lower back pain    Plan Rx given for flexeril and medrol dose pack. Answered no to pregnancy/breastfeeding on questionnaire. Messaged patient to verify prior to starting meds.  Follow up with PCP if no improvement    Time spent on visit 11 min

## 2023-09-05 ENCOUNTER — HOSPITAL ENCOUNTER (EMERGENCY)
Age: 28
Discharge: HOME | End: 2023-09-05
Attending: EMERGENCY MEDICINE
Payer: COMMERCIAL

## 2023-09-05 VITALS
TEMPERATURE: 98.1 F | HEIGHT: 65 IN | WEIGHT: 250 LBS | HEART RATE: 100 BPM | RESPIRATION RATE: 18 BRPM | SYSTOLIC BLOOD PRESSURE: 102 MMHG | BODY MASS INDEX: 41.65 KG/M2 | OXYGEN SATURATION: 98 % | DIASTOLIC BLOOD PRESSURE: 55 MMHG

## 2023-09-05 DIAGNOSIS — H65.92 FLUID LEVEL BEHIND TYMPANIC MEMBRANE OF LEFT EAR: ICD-10-CM

## 2023-09-05 DIAGNOSIS — H66.011 NON-RECURRENT ACUTE SUPPURATIVE OTITIS MEDIA OF RIGHT EAR WITH SPONTANEOUS RUPTURE OF TYMPANIC MEMBRANE: Primary | ICD-10-CM

## 2023-09-05 PROCEDURE — 6370000000 HC RX 637 (ALT 250 FOR IP): Performed by: EMERGENCY MEDICINE

## 2023-09-05 PROCEDURE — 99283 EMERGENCY DEPT VISIT LOW MDM: CPT

## 2023-09-05 RX ORDER — AMOXICILLIN AND CLAVULANATE POTASSIUM 875; 125 MG/1; MG/1
1 TABLET, FILM COATED ORAL ONCE
Status: COMPLETED | OUTPATIENT
Start: 2023-09-05 | End: 2023-09-05

## 2023-09-05 RX ORDER — AMOXICILLIN AND CLAVULANATE POTASSIUM 875; 125 MG/1; MG/1
1 TABLET, FILM COATED ORAL 2 TIMES DAILY
Qty: 20 TABLET | Refills: 0 | Status: SHIPPED | OUTPATIENT
Start: 2023-09-05 | End: 2023-09-15

## 2023-09-05 RX ADMIN — AMOXICILLIN AND CLAVULANATE POTASSIUM 1 TABLET: 875; 125 TABLET, FILM COATED ORAL at 20:07

## 2023-09-05 ASSESSMENT — PAIN - FUNCTIONAL ASSESSMENT: PAIN_FUNCTIONAL_ASSESSMENT: 0-10

## 2023-09-05 ASSESSMENT — PAIN SCALES - GENERAL: PAINLEVEL_OUTOF10: 4

## 2023-09-05 NOTE — ED PROVIDER NOTES
CHIEF COMPLAINT    Chief Complaint   Patient presents with    Otitis Media     Pt is taking an old abx prescription keflex for ear pain, ear pain has worsened since then      HPI  Stacy Mayer is a 29 y.o. female who presents to the ED with complaints of right-sided ear pain over the last 2 days and now with some bleeding from the right ear this evening. Also has been experiencing the sensation of pressure in the left ear today. Pain in the right ear described as throbbing and stabbing rated 5/10. This pain is constant. She attempted to start using a home prescription of Keflex that was left over from prior prescription without relief. Her pain does not radiate. Nothing seems to make pain much better currently. Denies fevers, chills, dizziness, lightheadedness, nausea, vomiting      REVIEW OF SYSTEMS  Constitutional: No fever, chills   Eye: No visual changes  HENT: Complains of right ear pain and bleeding as well as some pressure in the left ear  Resp: No SOB or productive cough. Cardio: No chest pain or palpitations. GI: No abdominal pain, nausea, vomiting, constipation or diarrhea. No melena. : No dysuria, urgency or frequency. Endocrine: No heat intolerance, no cold intolerance, no polydipsia   Lymphatics: No adenopathy  Musculoskeletal: No new muscle aches or joint pain. Neuro: No headaches. Psych: No homicidal or suicidal thoughts  Skin: No rash, No itching. ?  ? PAST MEDICAL HISTORY  History reviewed. No pertinent past medical history. FAMILY HISTORY  History reviewed. No pertinent family history.   SOCIAL HISTORY  Social History     Socioeconomic History    Marital status:      Spouse name: None    Number of children: None    Years of education: None    Highest education level: None   Tobacco Use    Smoking status: Some Days    Smokeless tobacco: Never   Substance and Sexual Activity    Alcohol use: Yes    Drug use: No    Sexual activity: Yes     Partners: Female   Social History mottling, No erythema, No rash. Extremities: No edema, No tenderness, No cyanosis, Normal perfusion, No clubbing. Musculoskeletal: Good range of motion in all major joints as observed. No major deformities noted. Neurologic: Alert & oriented x 3,  No focal deficits noted. RADIOLOGY  Labs Reviewed - No data to display  I personally reviewed the images. The radiologist's interpretation reveals:  Last Imaging results   No orders to display       MEDS GIVEN IN ED:  Medications   amoxicillin-clavulanate (AUGMENTIN) 875-125 MG per tablet 1 tablet (has no administration in time range)     COURSE & MEDICAL DECISION MAKING  This is a 80-year-old female who presents emergency department with complaints of right ear pain over the last 2 days and now with some bleeding from right ear this evening as well as some pressure in the left ear. No other systemic symptoms. Initial vital signs are reassuring with evidence of fever. She is nontoxic-appearing on exam.  Her left tympanic membrane demonstrates middle ear effusion without bulging or erythema. Her right tympanic membrane demonstrates erythema and bulging to the superior and anterior portion with a small area of dried blood suspicious for contained perforation. She has no mastoid tenderness auricular displacement. At this time we will initiate her on Augmentin therapy for treatment of otitis media. Discharged home with a prescription for the same. Return precautions provided. Amount and/or Complexity of Data Reviewed  Clinical lab tests: reviewed  Decide to obtain previous medical records or to obtain history from someone other than the patient: yes       -  Patient seen and evaluated in the emergency department. -  Triage and nursing notes reviewed and incorporated. -  Old chart records reviewed and incorporated. -  Work-up included:  See above      Appropriate PPE utilized as indicated for entire patient encounter?   Time of Disposition: See

## 2024-05-28 ENCOUNTER — HOSPITAL ENCOUNTER (EMERGENCY)
Age: 29
Discharge: HOME | End: 2024-05-28
Attending: EMERGENCY MEDICINE
Payer: COMMERCIAL

## 2024-05-28 VITALS
RESPIRATION RATE: 18 BRPM | HEART RATE: 100 BPM | SYSTOLIC BLOOD PRESSURE: 161 MMHG | OXYGEN SATURATION: 100 % | DIASTOLIC BLOOD PRESSURE: 85 MMHG | HEIGHT: 65 IN | TEMPERATURE: 97.9 F | BODY MASS INDEX: 41.65 KG/M2 | WEIGHT: 250 LBS

## 2024-05-28 DIAGNOSIS — M54.31 SCIATICA OF RIGHT SIDE: Primary | ICD-10-CM

## 2024-05-28 PROCEDURE — 99283 EMERGENCY DEPT VISIT LOW MDM: CPT

## 2024-05-28 RX ORDER — METHYLPREDNISOLONE 4 MG/1
TABLET ORAL
Qty: 1 KIT | Refills: 0 | Status: SHIPPED | OUTPATIENT
Start: 2024-05-28 | End: 2024-06-03

## 2024-05-28 ASSESSMENT — PAIN DESCRIPTION - FREQUENCY: FREQUENCY: CONTINUOUS

## 2024-05-28 ASSESSMENT — ENCOUNTER SYMPTOMS: BACK PAIN: 1

## 2024-05-28 ASSESSMENT — PAIN - FUNCTIONAL ASSESSMENT: PAIN_FUNCTIONAL_ASSESSMENT: 0-10

## 2024-05-28 ASSESSMENT — LIFESTYLE VARIABLES
HOW OFTEN DO YOU HAVE A DRINK CONTAINING ALCOHOL: MONTHLY OR LESS
HOW MANY STANDARD DRINKS CONTAINING ALCOHOL DO YOU HAVE ON A TYPICAL DAY: 1 OR 2

## 2024-05-28 ASSESSMENT — PAIN DESCRIPTION - ORIENTATION: ORIENTATION: LEFT

## 2024-05-28 ASSESSMENT — PAIN DESCRIPTION - DESCRIPTORS: DESCRIPTORS: ACHING;DISCOMFORT

## 2024-05-28 ASSESSMENT — PAIN DESCRIPTION - LOCATION: LOCATION: HIP;LEG

## 2024-05-28 ASSESSMENT — PAIN SCALES - GENERAL: PAINLEVEL_OUTOF10: 9

## 2024-05-28 ASSESSMENT — PAIN DESCRIPTION - PAIN TYPE: TYPE: ACUTE PAIN

## 2024-05-28 NOTE — ED PROVIDER NOTES
Triage Chief Complaint:   Hip Pain and Back Pain (Pt was cleaning her grill and lifted the grate causing rt sided sciatic pain)    [place] [facility]:  Jessy Guido is a 28 y.o. female that presents to the ED ambulatory complaint of right low back and buttock pain.  Secondarily she took 1 dose of leftover steroid.  She lifted the grill cover with sudden.  She is he has been having back pain sciatica for 5 years since delivery.  No hyperesthesias red flags no incontinence        Back Pain: RED FLAGS  Unexplained weigh loss: absent  Current infection: absent  Immunosuppression: absent  Fracture or suspected fracture: absent  MVA with suspicion of fracture: absent  Major Fall with suspicion of fracture: absent  Saddle anesthesia: absent  Recent onset bladder dysfunction: absent  Recent onset fecal incontinence: absent  Major motor weakness: absent  History of cancer: absent           No past medical history on file.  Past Surgical History:   Procedure Laterality Date    CYST REMOVAL Left     LYMPH NODE DISSECTION       No family history on file.  Social History     Socioeconomic History    Marital status:      Spouse name: Not on file    Number of children: Not on file    Years of education: Not on file    Highest education level: Not on file   Occupational History    Not on file   Tobacco Use    Smoking status: Some Days     Types: Cigarettes    Smokeless tobacco: Never   Vaping Use    Vaping Use: Every day    Substances: Nicotine   Substance and Sexual Activity    Alcohol use: Not Currently    Drug use: No    Sexual activity: Yes     Partners: Female   Other Topics Concern    Not on file   Social History Narrative    ** Merged History Encounter **          Social Determinants of Health     Financial Resource Strain: Not on file   Food Insecurity: Not on file   Transportation Needs: Not on file   Physical Activity: Not on file   Stress: Not on file   Social Connections: Not on file   Intimate Partner Violence: Not on

## 2024-09-12 ENCOUNTER — E-VISIT (OUTPATIENT)
Dept: PRIMARY CARE CLINIC | Age: 29
End: 2024-09-12
Payer: COMMERCIAL

## 2024-09-12 DIAGNOSIS — M54.40 ACUTE MIDLINE LOW BACK PAIN WITH SCIATICA, SCIATICA LATERALITY UNSPECIFIED: Primary | ICD-10-CM

## 2024-09-12 PROCEDURE — 99422 OL DIG E/M SVC 11-20 MIN: CPT | Performed by: NURSE PRACTITIONER

## 2024-09-12 RX ORDER — METHYLPREDNISOLONE 4 MG
TABLET, DOSE PACK ORAL
Qty: 1 KIT | Refills: 0 | Status: SHIPPED | OUTPATIENT
Start: 2024-09-12 | End: 2024-09-13 | Stop reason: SDUPTHER

## 2024-09-13 RX ORDER — METHYLPREDNISOLONE 4 MG
TABLET, DOSE PACK ORAL
Qty: 1 KIT | Refills: 0 | Status: CANCELLED | OUTPATIENT
Start: 2024-09-13 | End: 2024-09-19

## 2024-09-13 RX ORDER — METHYLPREDNISOLONE 4 MG
TABLET, DOSE PACK ORAL
Qty: 1 KIT | Refills: 0 | Status: SHIPPED | OUTPATIENT
Start: 2024-09-13 | End: 2024-09-19

## 2024-09-25 ENCOUNTER — OFFICE VISIT (OUTPATIENT)
Age: 29
End: 2024-09-25

## 2024-09-25 VITALS
DIASTOLIC BLOOD PRESSURE: 70 MMHG | RESPIRATION RATE: 20 BRPM | SYSTOLIC BLOOD PRESSURE: 124 MMHG | HEART RATE: 92 BPM | WEIGHT: 270.6 LBS | OXYGEN SATURATION: 96 % | BODY MASS INDEX: 45.03 KG/M2

## 2024-09-25 DIAGNOSIS — Z02.89 EXAMINATION, PHYSICAL, EMPLOYEE: Primary | ICD-10-CM

## 2024-09-25 DIAGNOSIS — R63.5 WEIGHT GAIN: ICD-10-CM

## 2024-09-25 DIAGNOSIS — E66.01 CLASS 3 SEVERE OBESITY DUE TO EXCESS CALORIES WITH SERIOUS COMORBIDITY AND BODY MASS INDEX (BMI) OF 45.0 TO 49.9 IN ADULT: ICD-10-CM

## 2024-09-25 DIAGNOSIS — Z76.89 ESTABLISHING CARE WITH NEW DOCTOR, ENCOUNTER FOR: ICD-10-CM

## 2024-09-25 DIAGNOSIS — R53.83 OTHER FATIGUE: ICD-10-CM

## 2024-09-25 DIAGNOSIS — R06.81 WITNESSED APNEIC SPELLS: ICD-10-CM

## 2024-09-25 DIAGNOSIS — G47.19 EXCESSIVE DAYTIME SLEEPINESS: ICD-10-CM

## 2024-09-25 PROBLEM — E66.813 CLASS 3 SEVERE OBESITY DUE TO EXCESS CALORIES WITH SERIOUS COMORBIDITY AND BODY MASS INDEX (BMI) OF 45.0 TO 49.9 IN ADULT: Status: ACTIVE | Noted: 2024-09-25

## 2024-09-25 PROBLEM — R00.2 HEART PALPITATIONS: Status: RESOLVED | Noted: 2019-08-20 | Resolved: 2024-09-25

## 2024-09-25 SDOH — ECONOMIC STABILITY: FOOD INSECURITY: WITHIN THE PAST 12 MONTHS, THE FOOD YOU BOUGHT JUST DIDN'T LAST AND YOU DIDN'T HAVE MONEY TO GET MORE.: NEVER TRUE

## 2024-09-25 SDOH — ECONOMIC STABILITY: FOOD INSECURITY: WITHIN THE PAST 12 MONTHS, YOU WORRIED THAT YOUR FOOD WOULD RUN OUT BEFORE YOU GOT MONEY TO BUY MORE.: NEVER TRUE

## 2024-09-25 SDOH — HEALTH STABILITY: PHYSICAL HEALTH: ON AVERAGE, HOW MANY DAYS PER WEEK DO YOU ENGAGE IN MODERATE TO STRENUOUS EXERCISE (LIKE A BRISK WALK)?: 2 DAYS

## 2024-09-25 SDOH — ECONOMIC STABILITY: INCOME INSECURITY: HOW HARD IS IT FOR YOU TO PAY FOR THE VERY BASICS LIKE FOOD, HOUSING, MEDICAL CARE, AND HEATING?: SOMEWHAT HARD

## 2024-09-25 SDOH — HEALTH STABILITY: PHYSICAL HEALTH: ON AVERAGE, HOW MANY MINUTES DO YOU ENGAGE IN EXERCISE AT THIS LEVEL?: 20 MIN

## 2024-09-25 ASSESSMENT — ENCOUNTER SYMPTOMS
VOMITING: 0
NAUSEA: 0
ABDOMINAL PAIN: 0
RHINORRHEA: 0
SORE THROAT: 0
SHORTNESS OF BREATH: 0
COUGH: 0

## 2024-09-25 ASSESSMENT — PATIENT HEALTH QUESTIONNAIRE - PHQ9
4. FEELING TIRED OR HAVING LITTLE ENERGY: MORE THAN HALF THE DAYS
10. IF YOU CHECKED OFF ANY PROBLEMS, HOW DIFFICULT HAVE THESE PROBLEMS MADE IT FOR YOU TO DO YOUR WORK, TAKE CARE OF THINGS AT HOME, OR GET ALONG WITH OTHER PEOPLE: SOMEWHAT DIFFICULT
SUM OF ALL RESPONSES TO PHQ QUESTIONS 1-9: 16
7. TROUBLE CONCENTRATING ON THINGS, SUCH AS READING THE NEWSPAPER OR WATCHING TELEVISION: MORE THAN HALF THE DAYS
2. FEELING DOWN, DEPRESSED OR HOPELESS: MORE THAN HALF THE DAYS
SUM OF ALL RESPONSES TO PHQ QUESTIONS 1-9: 16
3. TROUBLE FALLING OR STAYING ASLEEP: MORE THAN HALF THE DAYS
5. POOR APPETITE OR OVEREATING: NEARLY EVERY DAY
SUM OF ALL RESPONSES TO PHQ QUESTIONS 1-9: 16
6. FEELING BAD ABOUT YOURSELF - OR THAT YOU ARE A FAILURE OR HAVE LET YOURSELF OR YOUR FAMILY DOWN: NEARLY EVERY DAY
SUM OF ALL RESPONSES TO PHQ9 QUESTIONS 1 & 2: 4
1. LITTLE INTEREST OR PLEASURE IN DOING THINGS: MORE THAN HALF THE DAYS
SUM OF ALL RESPONSES TO PHQ QUESTIONS 1-9: 16
9. THOUGHTS THAT YOU WOULD BE BETTER OFF DEAD, OR OF HURTING YOURSELF: NOT AT ALL
8. MOVING OR SPEAKING SO SLOWLY THAT OTHER PEOPLE COULD HAVE NOTICED. OR THE OPPOSITE, BEING SO FIGETY OR RESTLESS THAT YOU HAVE BEEN MOVING AROUND A LOT MORE THAN USUAL: NOT AT ALL

## 2024-09-25 ASSESSMENT — VISUAL ACUITY: OU: 1

## 2024-09-26 LAB
ALBUMIN SERPL-MCNC: 4.3 G/DL (ref 3.4–5)
ALBUMIN/GLOB SERPL: 1.7 {RATIO} (ref 1.1–2.2)
ALP SERPL-CCNC: 67 U/L (ref 40–129)
ALT SERPL-CCNC: 21 U/L (ref 10–40)
ANION GAP SERPL CALCULATED.3IONS-SCNC: 12 MMOL/L (ref 3–16)
AST SERPL-CCNC: 15 U/L (ref 15–37)
BASOPHILS # BLD: 0.1 K/UL (ref 0–0.2)
BASOPHILS NFR BLD: 0.9 %
BILIRUB SERPL-MCNC: <0.2 MG/DL (ref 0–1)
BUN SERPL-MCNC: 9 MG/DL (ref 7–20)
CALCIUM SERPL-MCNC: 9.5 MG/DL (ref 8.3–10.6)
CHLORIDE SERPL-SCNC: 106 MMOL/L (ref 99–110)
CHOLEST SERPL-MCNC: 169 MG/DL (ref 0–199)
CO2 SERPL-SCNC: 21 MMOL/L (ref 21–32)
CREAT SERPL-MCNC: <0.5 MG/DL (ref 0.6–1.1)
DEPRECATED RDW RBC AUTO: 15.5 % (ref 12.4–15.4)
EOSINOPHIL # BLD: 0.1 K/UL (ref 0–0.6)
EOSINOPHIL NFR BLD: 1.1 %
GFR SERPLBLD CREATININE-BSD FMLA CKD-EPI: >90 ML/MIN/{1.73_M2}
GLUCOSE SERPL-MCNC: 81 MG/DL (ref 70–99)
HCT VFR BLD AUTO: 33.6 % (ref 36–48)
HDLC SERPL-MCNC: 33 MG/DL (ref 40–60)
HGB BLD-MCNC: 10.8 G/DL (ref 12–16)
LDLC SERPL CALC-MCNC: 107 MG/DL
LYMPHOCYTES # BLD: 2.6 K/UL (ref 1–5.1)
LYMPHOCYTES NFR BLD: 27 %
MCH RBC QN AUTO: 23.9 PG (ref 26–34)
MCHC RBC AUTO-ENTMCNC: 32.3 G/DL (ref 31–36)
MCV RBC AUTO: 74.1 FL (ref 80–100)
MONOCYTES # BLD: 0.5 K/UL (ref 0–1.3)
MONOCYTES NFR BLD: 5.5 %
NEUTROPHILS # BLD: 6.4 K/UL (ref 1.7–7.7)
NEUTROPHILS NFR BLD: 65.5 %
PLATELET # BLD AUTO: 259 K/UL (ref 135–450)
PMV BLD AUTO: 8.8 FL (ref 5–10.5)
POTASSIUM SERPL-SCNC: 4.2 MMOL/L (ref 3.5–5.1)
PROT SERPL-MCNC: 6.9 G/DL (ref 6.4–8.2)
RBC # BLD AUTO: 4.54 M/UL (ref 4–5.2)
SODIUM SERPL-SCNC: 139 MMOL/L (ref 136–145)
TRIGL SERPL-MCNC: 147 MG/DL (ref 0–150)
TSH SERPL DL<=0.005 MIU/L-ACNC: 0.95 UIU/ML (ref 0.27–4.2)
VLDLC SERPL CALC-MCNC: 29 MG/DL
WBC # BLD AUTO: 9.8 K/UL (ref 4–11)

## 2024-10-03 ENCOUNTER — OFFICE VISIT (OUTPATIENT)
Age: 29
End: 2024-10-03
Payer: COMMERCIAL

## 2024-10-03 VITALS
OXYGEN SATURATION: 98 % | HEART RATE: 79 BPM | BODY MASS INDEX: 44.3 KG/M2 | RESPIRATION RATE: 16 BRPM | WEIGHT: 266.2 LBS | SYSTOLIC BLOOD PRESSURE: 126 MMHG | DIASTOLIC BLOOD PRESSURE: 68 MMHG

## 2024-10-03 DIAGNOSIS — F32.89 OTHER DEPRESSION: Primary | ICD-10-CM

## 2024-10-03 DIAGNOSIS — D50.9 MICROCYTIC ANEMIA: ICD-10-CM

## 2024-10-03 PROBLEM — F32.A DEPRESSION: Status: ACTIVE | Noted: 2024-10-03

## 2024-10-03 PROCEDURE — 99214 OFFICE O/P EST MOD 30 MIN: CPT

## 2024-10-03 RX ORDER — ACETAMINOPHEN 325 MG/1
650 TABLET ORAL EVERY 6 HOURS PRN
COMMUNITY

## 2024-10-03 RX ORDER — ESCITALOPRAM OXALATE 10 MG/1
10 TABLET ORAL DAILY
Qty: 30 TABLET | Refills: 3 | Status: SHIPPED | OUTPATIENT
Start: 2024-10-03

## 2024-10-03 ASSESSMENT — ENCOUNTER SYMPTOMS
ABDOMINAL PAIN: 0
NAUSEA: 0
RHINORRHEA: 0
SHORTNESS OF BREATH: 0
COUGH: 0
VOMITING: 0
SORE THROAT: 0

## 2024-10-03 ASSESSMENT — PATIENT HEALTH QUESTIONNAIRE - PHQ9
SUM OF ALL RESPONSES TO PHQ9 QUESTIONS 1 & 2: 0
SUM OF ALL RESPONSES TO PHQ QUESTIONS 1-9: 0
SUM OF ALL RESPONSES TO PHQ QUESTIONS 1-9: 0
1. LITTLE INTEREST OR PLEASURE IN DOING THINGS: NOT AT ALL
SUM OF ALL RESPONSES TO PHQ QUESTIONS 1-9: 0
2. FEELING DOWN, DEPRESSED OR HOPELESS: NOT AT ALL
SUM OF ALL RESPONSES TO PHQ QUESTIONS 1-9: 0

## 2024-10-03 NOTE — PROGRESS NOTES
[facility]  [address]  [facility] [address]  Dept: [phone]  Dept Fax: [phone]  Loc: [phone]      Visit type: Established patient    Encounter Start Time: 10:53 AM EDT  Encounter End Time: 11:29 PM EDT     Reason for Visit: Follow-up (Blood work no other concerns)      Assessment and Plan       1. Other depression  Assessment & Plan:  -Uncontrolled   -Concomitant anemia makes primary or secondary depression uncertain   -Patient will be tested for sleep apnea at the beginning of the new year  -Prognosis: Good  -SI/HI: No  -Current medical management: None  -Change in medical management: Yes -Lexapro 10 mg  -Cognitive therapy: No  -Physical therapy: No  -Current symptoms:  -Sadness  -Anhedonia  -Changes in weight  -Changes in appetite  -Fatigue  -Psychomotor changes  -Guilt  -Resolved symptoms   -None  -Date of complete resolution:N/A  -2nd Episode  -Fire arms at home: Yes - Stored in [employer], ammo separate, Does not have access  Orders:  -     escitalopram (LEXAPRO) 10 MG tablet; Take 1 tablet by mouth daily, Disp-30 tablet, R-3Normal  2. Microcytic anemia  Assessment & Plan:  -Uncontrolled  -Fatigue and other symptoms still present  Orders:  -     Ferritin; Future  -     Iron and TIBC; Future        Patient was involved and agreeable in shared decision making.  Information about different treatment options including side effects discussed with patient.  Any information requested was supplied.    Return in about 8 weeks (around 11/28/2024) for Mood.      Subjective       Jessy uGido is a 29 y.o. female who is here for fatigue follow-up.    =======================================================  [Mood disorder]      Onset: May  Depressive Symptoms:  Sadness  Anhedonia  Changes in weight  Changes in appetite  Fatigue  Psychomotor changes  Guilt    Anxiety Symptoms:  Excessive worry  Restlessness  Easily fatigued  Difficulty

## 2024-10-03 NOTE — ASSESSMENT & PLAN NOTE
-Uncontrolled   -Concomitant anemia makes primary or secondary depression uncertain   -Patient will be tested for sleep apnea at the beginning of the new year  -Prognosis: Good  -SI/HI: No  -Current medical management: None  -Change in medical management: Yes -Lexapro 10 mg  -Cognitive therapy: No  -Physical therapy: No  -Current symptoms:  -Sadness  -Anhedonia  -Changes in weight  -Changes in appetite  -Fatigue  -Psychomotor changes  -Guilt  -Resolved symptoms   -None  -Date of complete resolution:N/A  -2nd Episode  -Fire arms at home: Yes - Stored in [employer], [employer]yl[employer] separate, Does not have access

## 2024-10-04 LAB
FERRITIN SERPL IA-MCNC: 13.1 NG/ML (ref 15–150)
IRON SATN MFR SERPL: 8 % (ref 15–50)
IRON SERPL-MCNC: 32 UG/DL (ref 37–145)
TIBC SERPL-MCNC: 384 UG/DL (ref 260–445)

## 2024-11-13 ENCOUNTER — PATIENT MESSAGE (OUTPATIENT)
Age: 29
End: 2024-11-13

## 2024-11-13 DIAGNOSIS — M54.40 BACK PAIN OF LUMBAR REGION WITH SCIATICA: Primary | ICD-10-CM

## 2024-11-25 RX ORDER — NAPROXEN 250 MG/1
500 TABLET ORAL 2 TIMES DAILY WITH MEALS
Qty: 56 TABLET | Refills: 0 | Status: SHIPPED | OUTPATIENT
Start: 2024-11-25 | End: 2024-12-09

## 2024-11-25 NOTE — TELEPHONE ENCOUNTER
Complaining about continued back pain with sciatica.  Finished course of steroids which did help after which the pain returned.  Has appointment with provider on December 2.  Will provide naproxen 500 mg twice daily for 14 days with reassessment at appointment on December 2.  Patient was instructed not to have any other NSAIDs while taking naproxen.  Instructed to have food when taking the pills.  Informed about side effects and to inform physician if side effects occurred.    Estee Coughlin MD  Family Medicine  11/25/24  12:15 PM

## 2025-01-16 DIAGNOSIS — F32.89 OTHER DEPRESSION: ICD-10-CM

## 2025-01-20 RX ORDER — ESCITALOPRAM OXALATE 10 MG/1
10 TABLET ORAL DAILY
Qty: 30 TABLET | Refills: 3 | Status: SHIPPED | OUTPATIENT
Start: 2025-01-20

## 2025-05-30 ENCOUNTER — OFFICE VISIT (OUTPATIENT)
Age: 30
End: 2025-05-30
Payer: COMMERCIAL

## 2025-05-30 VITALS
BODY MASS INDEX: 46.63 KG/M2 | WEIGHT: 280.2 LBS | HEART RATE: 91 BPM | RESPIRATION RATE: 20 BRPM | DIASTOLIC BLOOD PRESSURE: 76 MMHG | OXYGEN SATURATION: 98 % | SYSTOLIC BLOOD PRESSURE: 122 MMHG

## 2025-05-30 DIAGNOSIS — F32.5 MAJOR DEPRESSIVE DISORDER WITH SINGLE EPISODE, IN FULL REMISSION: ICD-10-CM

## 2025-05-30 DIAGNOSIS — R60.0 PEDAL EDEMA: ICD-10-CM

## 2025-05-30 DIAGNOSIS — R73.9 HYPERGLYCEMIA: ICD-10-CM

## 2025-05-30 DIAGNOSIS — D50.8 IRON DEFICIENCY ANEMIA SECONDARY TO INADEQUATE DIETARY IRON INTAKE: Primary | ICD-10-CM

## 2025-05-30 DIAGNOSIS — F32.89 OTHER DEPRESSION: ICD-10-CM

## 2025-05-30 PROCEDURE — G2211 COMPLEX E/M VISIT ADD ON: HCPCS

## 2025-05-30 PROCEDURE — 99214 OFFICE O/P EST MOD 30 MIN: CPT

## 2025-05-30 RX ORDER — IBUPROFEN 200 MG
200 TABLET ORAL EVERY 6 HOURS PRN
COMMUNITY

## 2025-05-30 RX ORDER — ESCITALOPRAM OXALATE 10 MG/1
10 TABLET ORAL DAILY
Qty: 90 TABLET | Refills: 0 | Status: SHIPPED | OUTPATIENT
Start: 2025-05-30 | End: 2025-08-28

## 2025-05-30 SDOH — ECONOMIC STABILITY: TRANSPORTATION INSECURITY
IN THE PAST 12 MONTHS, HAS THE LACK OF TRANSPORTATION KEPT YOU FROM MEDICAL APPOINTMENTS OR FROM GETTING MEDICATIONS?: PATIENT DECLINED

## 2025-05-30 SDOH — ECONOMIC STABILITY: TRANSPORTATION INSECURITY
IN THE PAST 12 MONTHS, HAS LACK OF TRANSPORTATION KEPT YOU FROM MEETINGS, WORK, OR FROM GETTING THINGS NEEDED FOR DAILY LIVING?: PATIENT DECLINED

## 2025-05-30 SDOH — ECONOMIC STABILITY: FOOD INSECURITY: WITHIN THE PAST 12 MONTHS, YOU WORRIED THAT YOUR FOOD WOULD RUN OUT BEFORE YOU GOT MONEY TO BUY MORE.: PATIENT DECLINED

## 2025-05-30 SDOH — ECONOMIC STABILITY: INCOME INSECURITY: IN THE LAST 12 MONTHS, WAS THERE A TIME WHEN YOU WERE NOT ABLE TO PAY THE MORTGAGE OR RENT ON TIME?: PATIENT DECLINED

## 2025-05-30 SDOH — ECONOMIC STABILITY: FOOD INSECURITY: WITHIN THE PAST 12 MONTHS, THE FOOD YOU BOUGHT JUST DIDN'T LAST AND YOU DIDN'T HAVE MONEY TO GET MORE.: PATIENT DECLINED

## 2025-05-30 ASSESSMENT — PATIENT HEALTH QUESTIONNAIRE - PHQ9
3. TROUBLE FALLING OR STAYING ASLEEP: NOT AT ALL
SUM OF ALL RESPONSES TO PHQ QUESTIONS 1-9: 0
1. LITTLE INTEREST OR PLEASURE IN DOING THINGS: NOT AT ALL
SUM OF ALL RESPONSES TO PHQ QUESTIONS 1-9: 0
10. IF YOU CHECKED OFF ANY PROBLEMS, HOW DIFFICULT HAVE THESE PROBLEMS MADE IT FOR YOU TO DO YOUR WORK, TAKE CARE OF THINGS AT HOME, OR GET ALONG WITH OTHER PEOPLE: NOT DIFFICULT AT ALL
7. TROUBLE CONCENTRATING ON THINGS, SUCH AS READING THE NEWSPAPER OR WATCHING TELEVISION: NOT AT ALL
6. FEELING BAD ABOUT YOURSELF - OR THAT YOU ARE A FAILURE OR HAVE LET YOURSELF OR YOUR FAMILY DOWN: NOT AT ALL
5. POOR APPETITE OR OVEREATING: NOT AT ALL
SUM OF ALL RESPONSES TO PHQ QUESTIONS 1-9: 0
SUM OF ALL RESPONSES TO PHQ QUESTIONS 1-9: 0
4. FEELING TIRED OR HAVING LITTLE ENERGY: NOT AT ALL
8. MOVING OR SPEAKING SO SLOWLY THAT OTHER PEOPLE COULD HAVE NOTICED. OR THE OPPOSITE, BEING SO FIGETY OR RESTLESS THAT YOU HAVE BEEN MOVING AROUND A LOT MORE THAN USUAL: NOT AT ALL
9. THOUGHTS THAT YOU WOULD BE BETTER OFF DEAD, OR OF HURTING YOURSELF: NOT AT ALL
2. FEELING DOWN, DEPRESSED OR HOPELESS: NOT AT ALL

## 2025-05-30 NOTE — PROGRESS NOTES
[facility]  [address]  [facility] [address]  Dept: [phone]  Dept Fax: [phone]  Loc: [phone]      Visit type: Established patient    Encounter Start Time: 8:33 AM EDT  Encounter End Time: 8:59 AM EDT     100% of the time was spent with the patient today, discussing their symptoms, conducting an examination, reviewing the patient's diagnostic test results, and counseling    Reason for Visit: Other (Bilateral Leg swelling and foot pain pt. Had ganglion cyst that ruptured on her foot and caused pain pt. Noticed high blood pressure recently and increase glucose. Pt. Has family history of type 1 and 2 diabetes. Glucose 142 this morning. Pt. Plans to start GLP-1 for weight loss through dietitian. )      Assessment and Plan         Assessment & Plan  Leg swelling  Uncontrolled  Worsening  DIAGNOSTIC STUDIES:   - Renal panel  TREATMENT:   - Compression stockings with a pressure of 40 mmHg  FUTURE PLANS:   - Assess kidney function due to her family history of kidney failure and concerns about infrequent urination.    Iron deficiency anemia  Improving  TREATMENT:   - Daily vitamin  MONITORING STUDIES:   - CBC    Hyperglycemia  Uncontrolled  Worsening  DIAGNOSTIC STUDIES:   - A1c test  TREATMENT:   - Advised to follow up with her dietitian  - Pending testing    Depression  Controlled  Improving  TREATMENT:   - Lexapro has been effective in managing depression, with occasional situational mood changes  - A 90-day supply of Lexapro will be sent to the pharmacy  FUTURE PLANS:   - Discussed the possibility of weaning off Lexapro in the future.    Follow-up  Follow up in 3 months.    Orders Placed This Encounter   Procedures    Hemoglobin A1C    CBC with Auto Differential    TSH    Comprehensive Metabolic Panel w/ Reflex to MG     Orders Placed This Encounter   Medications    escitalopram (LEXAPRO) 10 MG tablet     Sig: Take 1 tablet by mouth daily     Dispense:  90 tablet

## 2025-05-30 NOTE — PROGRESS NOTES
[facility]  [address]  [place] [address]  Dept: [phone]  Dept Fax: [phone]  Loc: [phone]      Visit type: {New vs Established:[id number]::\"Established patient\"}    Encounter Start Time: 8:30 AM EDT  Encounter End Time: 9:03 AM EDT     {BS HH/HSPC percentage;  by 5:66274::\"100%\"} {mm Time Doc:19445::\"of the time was spent with the patient today, discussing their symptoms, conducting an examination, reviewing the patient's diagnostic test results, and counseling\"}    Reason for Visit: Other (Bilateral Leg swelling and foot pain pt. Had ganglion cyst that ruptured on her foot and caused pain pt. Noticed high blood pressure recently and increase glucose. Pt. Has family history of type 1 and 2 diabetes. Glucose 142 this morning. Pt. Plans to start GLP-1 for weight loss through dietitian. )      Assessment and Plan         Assessment & Plan  Ganglion cyst  Stable  Improving  TREATMENT: Surgical removal was discussed as an option, but she prefers to leave it alone.    Leg swelling  Uncontrolled  Worsening  DIAGNOSTIC STUDIES:   - Renal panel  TREATMENT:   - Compression stockings with a pressure of 40 mmHg  FUTURE PLANS:   - Assess kidney function due to her family history of kidney failure and concerns about infrequent urination.    Iron deficiency anemia  Improving  TREATMENT:   - Daily vitamin  MONITORING STUDIES:   - CBC    Hyperglycemia  Uncontrolled  Worsening  DIAGNOSTIC STUDIES:   - A1c test  TREATMENT:   - Advised to follow up with her dietitian  - Consider starting the GLP-1 weight loss shot as discussed    Depression  Controlled  Improving  TREATMENT:   - Lexapro has been effective in managing depression, with occasional situational mood changes  - A 90-day supply of Lexapro will be sent to the pharmacy  FUTURE PLANS:   - Discussed the possibility of weaning off Lexapro in the future.    Follow-up  Follow up in 3 months.    No orders of the

## 2025-05-31 LAB
ALBUMIN SERPL-MCNC: 4.3 G/DL (ref 3.4–5)
ALBUMIN/GLOB SERPL: 1.6 {RATIO} (ref 1.1–2.2)
ALP SERPL-CCNC: 70 U/L (ref 40–129)
ALT SERPL-CCNC: 35 U/L (ref 10–40)
ANION GAP SERPL CALCULATED.3IONS-SCNC: 12 MMOL/L (ref 3–16)
AST SERPL-CCNC: 21 U/L (ref 15–37)
BASOPHILS # BLD: 0.1 K/UL (ref 0–0.2)
BASOPHILS NFR BLD: 1.2 %
BILIRUB SERPL-MCNC: <0.2 MG/DL (ref 0–1)
BUN SERPL-MCNC: 9 MG/DL (ref 7–20)
CALCIUM SERPL-MCNC: 9.2 MG/DL (ref 8.3–10.6)
CHLORIDE SERPL-SCNC: 107 MMOL/L (ref 99–110)
CO2 SERPL-SCNC: 19 MMOL/L (ref 21–32)
CREAT SERPL-MCNC: 0.6 MG/DL (ref 0.6–1.1)
DEPRECATED RDW RBC AUTO: 17.2 % (ref 12.4–15.4)
EOSINOPHIL # BLD: 0.1 K/UL (ref 0–0.6)
EOSINOPHIL NFR BLD: 0.7 %
EST. AVERAGE GLUCOSE BLD GHB EST-MCNC: 116.9 MG/DL
GFR SERPLBLD CREATININE-BSD FMLA CKD-EPI: >90 ML/MIN/{1.73_M2}
GLUCOSE SERPL-MCNC: 97 MG/DL (ref 70–99)
HBA1C MFR BLD: 5.7 %
HCT VFR BLD AUTO: 33.2 % (ref 36–48)
HGB BLD-MCNC: 11 G/DL (ref 12–16)
LYMPHOCYTES # BLD: 2.3 K/UL (ref 1–5.1)
LYMPHOCYTES NFR BLD: 30.3 %
MCH RBC QN AUTO: 23.6 PG (ref 26–34)
MCHC RBC AUTO-ENTMCNC: 33.3 G/DL (ref 31–36)
MCV RBC AUTO: 71.1 FL (ref 80–100)
MONOCYTES # BLD: 0.5 K/UL (ref 0–1.3)
MONOCYTES NFR BLD: 6.4 %
NEUTROPHILS # BLD: 4.8 K/UL (ref 1.7–7.7)
NEUTROPHILS NFR BLD: 61.4 %
PLATELET # BLD AUTO: 278 K/UL (ref 135–450)
PMV BLD AUTO: 9.3 FL (ref 5–10.5)
POTASSIUM SERPL-SCNC: 4.2 MMOL/L (ref 3.5–5.1)
PROT SERPL-MCNC: 7 G/DL (ref 6.4–8.2)
RBC # BLD AUTO: 4.67 M/UL (ref 4–5.2)
SODIUM SERPL-SCNC: 138 MMOL/L (ref 136–145)
TSH SERPL DL<=0.005 MIU/L-ACNC: 0.78 UIU/ML (ref 0.27–4.2)
WBC # BLD AUTO: 7.7 K/UL (ref 4–11)

## 2025-06-06 ENCOUNTER — RESULTS FOLLOW-UP (OUTPATIENT)
Age: 30
End: 2025-06-06

## 2025-06-06 DIAGNOSIS — D50.9 MICROCYTIC ANEMIA: Primary | ICD-10-CM

## 2025-06-11 ENCOUNTER — CLINICAL SUPPORT (OUTPATIENT)
Age: 30
End: 2025-06-11
Payer: COMMERCIAL

## 2025-06-11 DIAGNOSIS — D50.9 MICROCYTIC ANEMIA: ICD-10-CM

## 2025-06-11 PROCEDURE — 36415 COLL VENOUS BLD VENIPUNCTURE: CPT

## 2025-06-11 NOTE — PATIENT INSTRUCTIONS
We are committed to providing you the best care possible.    If you receive a survey after visiting one of our offices, please take time to share your experience concerning your physician office visit.  These surveys are confidential and no health information about you is shared.    We are eager to improve for you and continue to give you satisfactory care, we are counting on your feedback to help make that happen.            Welcome to [facility]:    Did you know we now have a faster way for you to move through your appointment? For your convenience, we now have digital registration available. When you schedule your next appointment, you will receive a link via your email as well as a text message that will allow you to complete any paperwork digitally before your appointment.

## 2025-06-12 ENCOUNTER — OFFICE VISIT (OUTPATIENT)
Age: 30
End: 2025-06-12
Payer: COMMERCIAL

## 2025-06-12 VITALS
RESPIRATION RATE: 20 BRPM | HEART RATE: 77 BPM | WEIGHT: 277 LBS | BODY MASS INDEX: 46.15 KG/M2 | OXYGEN SATURATION: 98 % | SYSTOLIC BLOOD PRESSURE: 116 MMHG | DIASTOLIC BLOOD PRESSURE: 60 MMHG | HEIGHT: 65 IN

## 2025-06-12 DIAGNOSIS — N92.6 MISSED PERIOD: ICD-10-CM

## 2025-06-12 DIAGNOSIS — N92.1 MENORRHAGIA WITH IRREGULAR CYCLE: ICD-10-CM

## 2025-06-12 DIAGNOSIS — E66.813 CLASS 3 SEVERE OBESITY DUE TO EXCESS CALORIES WITH SERIOUS COMORBIDITY AND BODY MASS INDEX (BMI) OF 45.0 TO 49.9 IN ADULT (HCC): Primary | ICD-10-CM

## 2025-06-12 DIAGNOSIS — F32.5 MAJOR DEPRESSIVE DISORDER WITH SINGLE EPISODE, IN FULL REMISSION: ICD-10-CM

## 2025-06-12 DIAGNOSIS — D50.8 IRON DEFICIENCY ANEMIA SECONDARY TO INADEQUATE DIETARY IRON INTAKE: ICD-10-CM

## 2025-06-12 LAB
CONTROL: NORMAL
FERRITIN SERPL IA-MCNC: 10.5 NG/ML (ref 15–150)
IRON SATN MFR SERPL: 8 % (ref 15–50)
IRON SERPL-MCNC: 32 UG/DL (ref 37–145)
PREGNANCY TEST URINE, POC: NORMAL
TIBC SERPL-MCNC: 410 UG/DL (ref 260–445)

## 2025-06-12 PROCEDURE — G2211 COMPLEX E/M VISIT ADD ON: HCPCS

## 2025-06-12 PROCEDURE — 81025 URINE PREGNANCY TEST: CPT

## 2025-06-12 PROCEDURE — 99214 OFFICE O/P EST MOD 30 MIN: CPT

## 2025-06-12 RX ORDER — ACETAMINOPHEN AND CODEINE PHOSPHATE 120; 12 MG/5ML; MG/5ML
1 SOLUTION ORAL DAILY
Qty: 30 TABLET | Refills: 0 | Status: SHIPPED | OUTPATIENT
Start: 2025-06-12 | End: 2025-07-12

## 2025-06-12 NOTE — PROGRESS NOTES
[facility]  [address]  [facility] [address]  Dept: [phone]  Dept Fax: [phone]  Loc: [phone]      Visit type: Established patient    Encounter Start Time: 9:03 AM EDT  Encounter End Time: 9:27 AM EDT     100% of the time was spent with the patient today, discussing their symptoms, conducting an examination, reviewing the patient's diagnostic test results, and counseling    Reason for Visit: Follow-up (3mth follow up no other concerns)      Assessment and Plan         Assessment & Plan  Anemia  Uncontrolled  Her anemia is likely due to heavy menstrual bleeding.  DIAGNOSTIC STUDIES:  - Urine pregnancy test  TREATMENT:  - Iron supplements with food to minimize gastrointestinal discomfort  - Dietary recommendations to increase iron-rich foods such as leafy greens and red meats  - Progesterone-only pills suggested as a potential treatment option to manage menstrual bleeding  FUTURE PLANS:  - If progesterone-only pills cause weight gain or other undesirable side effects, she can discontinue their use    Sleep Apnea  Uncontrolled  Missed her appointment with the sleep center. Untreated sleep apnea could contribute to her fatigue and weight gain.  TREATMENT:  - Encouraged to reschedule her appointment with the sleep center  FUTURE PLANS:  - Inform the provider within a week if she has called to reschedule    Weight Management  Uncontrolled  Expressed interest in starting Zepbound for weight loss, which is covered by her insurance. Addressing sleep apnea is crucial for effective weight management.  TREATMENT:  - Continue with dietary and lifestyle changes, including maintaining a balanced diet of at least 1500 calories per day and regular exercise  FUTURE PLANS:  - Insurance usually requires 3 months of diet and exercise documentation before covering the medication    Orders Placed This Encounter   Procedures    POCT urine pregnancy     Orders Placed This Encounter

## 2025-07-10 ENCOUNTER — OFFICE VISIT (OUTPATIENT)
Age: 30
End: 2025-07-10
Payer: COMMERCIAL

## 2025-07-10 VITALS
DIASTOLIC BLOOD PRESSURE: 70 MMHG | SYSTOLIC BLOOD PRESSURE: 122 MMHG | BODY MASS INDEX: 45.66 KG/M2 | WEIGHT: 274.4 LBS | RESPIRATION RATE: 20 BRPM | HEART RATE: 91 BPM | OXYGEN SATURATION: 98 %

## 2025-07-10 DIAGNOSIS — G47.19 EXCESSIVE DAYTIME SLEEPINESS: ICD-10-CM

## 2025-07-10 DIAGNOSIS — E66.813 CLASS 3 SEVERE OBESITY DUE TO EXCESS CALORIES WITH SERIOUS COMORBIDITY AND BODY MASS INDEX (BMI) OF 45.0 TO 49.9 IN ADULT (HCC): Primary | ICD-10-CM

## 2025-07-10 DIAGNOSIS — D50.8 IRON DEFICIENCY ANEMIA SECONDARY TO INADEQUATE DIETARY IRON INTAKE: ICD-10-CM

## 2025-07-10 DIAGNOSIS — R53.83 OTHER FATIGUE: ICD-10-CM

## 2025-07-10 DIAGNOSIS — F32.5 MAJOR DEPRESSIVE DISORDER WITH SINGLE EPISODE, IN FULL REMISSION: ICD-10-CM

## 2025-07-10 PROBLEM — Z02.89 EXAMINATION, PHYSICAL, EMPLOYEE: Status: RESOLVED | Noted: 2024-09-25 | Resolved: 2025-07-10

## 2025-07-10 PROBLEM — R06.81 WITNESSED APNEIC SPELLS: Status: RESOLVED | Noted: 2024-09-25 | Resolved: 2025-07-10

## 2025-07-10 PROBLEM — R63.5 WEIGHT GAIN: Status: RESOLVED | Noted: 2024-09-25 | Resolved: 2025-07-10

## 2025-07-10 PROCEDURE — G2211 COMPLEX E/M VISIT ADD ON: HCPCS

## 2025-07-10 PROCEDURE — 99214 OFFICE O/P EST MOD 30 MIN: CPT

## 2025-07-10 RX ORDER — M-VIT,TX,IRON,MINS/CALC/FOLIC 27MG-0.4MG
1 TABLET ORAL DAILY
COMMUNITY

## 2025-07-10 NOTE — ASSESSMENT & PLAN NOTE
-Over all: -Worsening  Wt Readings from Last 2 Encounters:   07/10/25 124.5 kg (274 lb 6.4 oz)   06/12/25 125.6 kg (277 lb)    -Energy: Much better   -Original Clothing Size: Shirt XXXL, Pants XXL   -Current Clothing Size: Shirt: XXL, Pants: Looser  -Action phase of change  -Current lifestyle goal  -Incorporation of resistance training  -Reassess in 1 month  -Future goals    -Maintain goals  -Goals in maintenance:   -At least 64 ounces of water daily  -No carbohydrates 2 hours before bedtime  -6 to 8 hours of sleep nightly  -Minimum 1500 calories a day  -Avoid high fructose corn syrup  -Will start with 3 days of exercise for 30 minutes  -5 meals a day without exceeding daily calorie limit of 2500  -With patient doing these lifestyle changes will need to evaluate for underlying health conditions   -Thyroid   -Sleep apnea   -Mood disorder  -Consider adding weight loss medication to speed up weight loss   -Will need to maintain lifestyle changes to maintain weight loss

## 2025-07-10 NOTE — PROGRESS NOTES
management  History of Present Illness  The patient is a 29-year-old female who presents for weight loss.    She reports a 3-pound weight loss attributed to the use of semaglutide injections, which she has discontinued due to high cost. Despite the modest weight loss, she feels more energetic and motivated, with improvements in her academic performance, home life, and work. She notes that her clothes are fitting looser, indicating a reduction in size. Her current weight is 270 pounds. She has been avoiding high fructose corn syrup and increasing her water intake. Her daily caloric intake is approximately 1500 calories, and she ensures to get 6 to 8 hours of sleep each night. She avoids consuming carbohydrates 2 hours before bedtime and drinks at least 64 ounces of water daily. Her physical activity includes bike riding, walking at least a mile a day, and resistance training a few times a week. She also performs leg lifts and squats. She is considering adding Zepbound to her regimen.    She reports an improvement in her mood since starting Lexapro almost a year ago. She is currently on the same dose of Lexapro. She is planning to start therapy soon. She reports no suicidal or homicidal ideations.    She has been taking a multivitamin supplement but recently discovered it does not contain iron. She recalls experiencing constipation when taking iron supplements during pregnancy but has not had any issues with them since.    She has not yet scheduled her sleep study. She reports feeling rested upon waking and no longer experiences shortness of breath at night. Her  has not observed any snoring or apneic spells.    She has noticed a change in her menstrual cycle, with heavier and longer periods than usual. She has scheduled an annual appointment with her gynecologist.    GYNECOLOGICAL HISTORY:  - Frequency and Flow: Heavier and longer than usual          Past medical history reviewed  Medication review  Allergies

## 2025-08-13 ENCOUNTER — TELEPHONE (OUTPATIENT)
Age: 30
End: 2025-08-13

## 2025-08-13 DIAGNOSIS — E66.813 CLASS 3 SEVERE OBESITY DUE TO EXCESS CALORIES WITH SERIOUS COMORBIDITY AND BODY MASS INDEX (BMI) OF 45.0 TO 49.9 IN ADULT (HCC): Primary | ICD-10-CM
